# Patient Record
Sex: FEMALE | Race: WHITE | NOT HISPANIC OR LATINO | Employment: UNEMPLOYED | URBAN - METROPOLITAN AREA
[De-identification: names, ages, dates, MRNs, and addresses within clinical notes are randomized per-mention and may not be internally consistent; named-entity substitution may affect disease eponyms.]

---

## 2018-03-22 ENCOUNTER — OFFICE VISIT (OUTPATIENT)
Dept: URGENT CARE | Age: 33
End: 2018-03-22
Payer: COMMERCIAL

## 2018-03-22 VITALS
DIASTOLIC BLOOD PRESSURE: 60 MMHG | BODY MASS INDEX: 21.34 KG/M2 | TEMPERATURE: 98.5 F | HEIGHT: 64 IN | OXYGEN SATURATION: 98 % | WEIGHT: 125 LBS | HEART RATE: 120 BPM | RESPIRATION RATE: 18 BRPM | SYSTOLIC BLOOD PRESSURE: 100 MMHG

## 2018-03-22 DIAGNOSIS — J32.9 SINOBRONCHITIS: Primary | ICD-10-CM

## 2018-03-22 DIAGNOSIS — J40 SINOBRONCHITIS: Primary | ICD-10-CM

## 2018-03-22 PROCEDURE — S9083 URGENT CARE CENTER GLOBAL: HCPCS | Performed by: FAMILY MEDICINE

## 2018-03-22 PROCEDURE — G0382 LEV 3 HOSP TYPE B ED VISIT: HCPCS | Performed by: FAMILY MEDICINE

## 2018-03-22 RX ORDER — AZITHROMYCIN 250 MG/1
TABLET, FILM COATED ORAL
Qty: 6 TABLET | Refills: 0 | Status: SHIPPED | OUTPATIENT
Start: 2018-03-22 | End: 2018-03-26

## 2018-03-23 NOTE — PROGRESS NOTES
[facility]        NAME: Christi Villarreal is a 33 y o  female  : 1985    MRN: [id number]  DATE: 2018  TIME: 8:10 PM    Assessment and Plan   Sinobronchitis [J32 9, J40]  1  Sinobronchitis  azithromycin (ZITHROMAX) 250 mg tablet         Patient Instructions     Patient Instructions   Take antibiotic as directed  Recommend daily probiotic while on antibiotic  Push fluids  May benefit from over-the-counter decongestant expectorant such as Mucinex D for daytime symptom relief  May take 1/2 to 1 tablet twice a day with full glass of fluid  If not improving over the next 5-7 days, recommend following up with your primary care doctor  If you develop any chest pain or difficulty breathing, proceed to emergency room for immediate emergency medical evaluation  Chief Complaint     Chief Complaint   Patient presents with    Cold Like Symptoms     x 3 weeks -  worsening harsh, congested cough - worse at HS with foul taste, nasal congestion with PND, b/l ear pressure, sl  sore throat and fever x 3 days (max was 101 4 last night)  Took Tylenol and Motrin together  at 7 pm     Cough    Fever         History of Present Illness   María English presents to the clinic c/o    78-year-old female has been sick for about 3 weeks  Started with cold symptoms  Then her children have been sick and she has been taking care of them  She feels like her cough and postnasal drainage and sinus drainage have worsened over the last few days  She is now running fever with body aches and pains and sore throat and ear pressure  She is not taking anything other than Motrin and Tylenol  She feels a little wheezy  She states that she is a nonsmoker  Review of Systems   Review of Systems   Constitutional: Positive for activity change, appetite change, chills, fatigue and fever  HENT: Positive for congestion, ear pain, postnasal drip, rhinorrhea and sore throat   Negative for ear discharge, hearing loss, mouth sores, nosebleeds, sinus pain and sinus pressure  Eyes: Negative  Respiratory: Positive for cough and chest tightness  Negative for apnea, choking, shortness of breath, wheezing and stridor  Cardiovascular: Negative for chest pain, palpitations and leg swelling  Gastrointestinal: Negative  Musculoskeletal: Positive for arthralgias and myalgias  Negative for neck stiffness  Skin: Negative for rash  Neurological: Negative for dizziness, light-headedness and headaches  Hematological: Negative for adenopathy  Current Medications     Long-Term Prescriptions   Medication Sig Dispense Refill    norethindrone-ethinyl estradiol-iron (ESTROSTEP FE) 1-20/1-30/1-35 MG-MCG TABS Take 1 tablet by mouth daily         Current Allergies     Allergies as of 03/22/2018 - Reviewed 03/22/2018   Allergen Reaction Noted    Penicillins Hives 06/24/2014            The following portions of the patient's history were reviewed and updated as appropriate: allergies, current medications, past family history, past medical history, past social history, past surgical history and problem list     Objective   /60 (BP Location: Right arm, Patient Position: Sitting, Cuff Size: Standard)   Pulse (!) 120   Temp 98 5 °F (36 9 °C) (Oral)   Resp 18   Ht 5' 4" (1 626 m)   Wt 56 7 kg (125 lb)   LMP 03/09/2018 (Approximate)   SpO2 98%   BMI 21 46 kg/m²        Physical Exam     Physical Exam   Constitutional: She is oriented to person, place, and time  She appears well-developed and well-nourished  No distress  Appears acutely ill but in no acute distress   HENT:   Head: Normocephalic and atraumatic  Right Ear: External ear normal    Left Ear: External ear normal    Cobblestoning of posterior pharynx with patchy redness  No exudate or fetid breath  Nasal turbinates red and edematous    No purulent mucus   Eyes: Conjunctivae and EOM are normal  Pupils are equal, round, and reactive to light  Right eye exhibits no discharge  Left eye exhibits no discharge  Neck: Normal range of motion  Neck supple  Cardiovascular: Normal rate, regular rhythm and normal heart sounds  Exam reveals no gallop and no friction rub  No murmur heard  Pulmonary/Chest: Effort normal and breath sounds normal  No respiratory distress  She has no wheezes  She has no rales  Lymphadenopathy:     She has no cervical adenopathy  Neurological: She is alert and oriented to person, place, and time  Skin: Skin is warm and dry  No rash noted  She is not diaphoretic  Psychiatric: She has a normal mood and affect  Nursing note and vitals reviewed

## 2018-03-23 NOTE — PATIENT INSTRUCTIONS
Take antibiotic as directed  Recommend daily probiotic while on antibiotic  Push fluids  May benefit from over-the-counter decongestant expectorant such as Mucinex D for daytime symptom relief  May take 1/2 to 1 tablet twice a day with full glass of fluid  If not improving over the next 5-7 days, recommend following up with your primary care doctor  If you develop any chest pain or difficulty breathing, proceed to emergency room for immediate emergency medical evaluation

## 2018-05-24 ENCOUNTER — OFFICE VISIT (OUTPATIENT)
Dept: URGENT CARE | Age: 33
End: 2018-05-24
Payer: COMMERCIAL

## 2018-05-24 VITALS
HEIGHT: 64 IN | BODY MASS INDEX: 21.51 KG/M2 | SYSTOLIC BLOOD PRESSURE: 125 MMHG | RESPIRATION RATE: 16 BRPM | WEIGHT: 126 LBS | DIASTOLIC BLOOD PRESSURE: 85 MMHG | OXYGEN SATURATION: 99 % | TEMPERATURE: 98.7 F | HEART RATE: 101 BPM

## 2018-05-24 DIAGNOSIS — K08.89 PAIN, DENTAL: Primary | ICD-10-CM

## 2018-05-24 PROCEDURE — S9083 URGENT CARE CENTER GLOBAL: HCPCS | Performed by: FAMILY MEDICINE

## 2018-05-24 PROCEDURE — G0381 LEV 2 HOSP TYPE B ED VISIT: HCPCS | Performed by: FAMILY MEDICINE

## 2018-05-24 RX ORDER — CLINDAMYCIN HYDROCHLORIDE 300 MG/1
300 CAPSULE ORAL 4 TIMES DAILY
Qty: 40 CAPSULE | Refills: 0 | Status: SHIPPED | OUTPATIENT
Start: 2018-05-24 | End: 2018-06-03

## 2018-05-24 NOTE — PROGRESS NOTES
[facility]        NAME: Yinka Gonzales is a 33 y o  female  : 1985    MRN: [id number]  DATE: May 24, 2018  TIME: 12:09 PM    Assessment and Plan   Pain, dental [K08 89]  1  Pain, dental  clindamycin (CLEOCIN) 300 MG capsule         Patient Instructions     Patient Instructions   Clindamycin 4 times a day until finished (please take probiotics)  Swish mouth with warm salt water or mouthwash  Soft foods  Pain medication as needed  Recheck/follow-up with oral surgeon as planned  Please go to the hospital emergency department if needed  Proceed to  ER if symptoms worsen  Chief Complaint     Chief Complaint   Patient presents with    Dental Pain     left side , infection          History of Present Illness       Patient with dental pain for the past 2 days; patient states she has been in contact with the oral surgeon for further care        Review of Systems   Review of Systems   Constitutional: Negative for chills and fever     HENT:        Left-sided dental pain         Current Medications       Current Outpatient Prescriptions:     clindamycin (CLEOCIN) 300 MG capsule, Take 1 capsule (300 mg total) by mouth 4 (four) times a day for 40 doses, Disp: 40 capsule, Rfl: 0    Multiple Vitamins-Minerals (CENTRUM ADULTS PO), Take 1 tablet by mouth daily, Disp: , Rfl:     norethindrone-ethinyl estradiol-iron (ESTROSTEP FE) 1-20/1-30/1-35 MG-MCG TABS, Take 1 tablet by mouth daily, Disp: , Rfl:     Current Allergies     Allergies as of 2018 - Reviewed 2018   Allergen Reaction Noted    Penicillins Hives 2014            The following portions of the patient's history were reviewed and updated as appropriate: allergies, current medications, past family history, past medical history, past social history, past surgical history and problem list      Past Medical History:   Diagnosis Date    Clotting disorder ([address] )        Past Surgical History:   Procedure Laterality Date  CERVICAL BIOPSY  W/ LOOP ELECTRODE EXCISION      WISDOM TOOTH EXTRACTION         No family history on file  Medications have been verified  Objective   /85   Pulse 101   Temp 98 7 °F (37 1 °C)   Resp 16   Ht 5' 4" (1 626 m)   Wt 57 2 kg (126 lb)   LMP 05/01/2018   SpO2 99%   BMI 21 63 kg/m²        Physical Exam     Physical Exam   HENT:   Tenderness of left lower molar with drainage present   Nursing note and vitals reviewed

## 2018-05-24 NOTE — PATIENT INSTRUCTIONS
Clindamycin 4 times a day until finished (please take probiotics)  Swish mouth with warm salt water or mouthwash  Soft foods  Pain medication as needed  Recheck/follow-up with oral surgeon as planned  Please go to the hospital emergency department if needed

## 2018-06-11 ENCOUNTER — OFFICE VISIT (OUTPATIENT)
Dept: URGENT CARE | Age: 33
End: 2018-06-11
Payer: COMMERCIAL

## 2018-06-11 VITALS
BODY MASS INDEX: 20.83 KG/M2 | SYSTOLIC BLOOD PRESSURE: 140 MMHG | DIASTOLIC BLOOD PRESSURE: 96 MMHG | WEIGHT: 125 LBS | TEMPERATURE: 98.8 F | RESPIRATION RATE: 20 BRPM | HEIGHT: 65 IN | OXYGEN SATURATION: 98 % | HEART RATE: 123 BPM

## 2018-06-11 DIAGNOSIS — S81.812A LACERATION OF LEFT LOWER EXTREMITY, INITIAL ENCOUNTER: Primary | ICD-10-CM

## 2018-06-11 PROCEDURE — 90714 TD VACC NO PRESV 7 YRS+ IM: CPT

## 2018-06-11 PROCEDURE — 90471 IMMUNIZATION ADMIN: CPT | Performed by: PHYSICIAN ASSISTANT

## 2018-06-11 PROCEDURE — G0382 LEV 3 HOSP TYPE B ED VISIT: HCPCS | Performed by: FAMILY MEDICINE

## 2018-06-11 PROCEDURE — 12002 RPR S/N/AX/GEN/TRNK2.6-7.5CM: CPT | Performed by: FAMILY MEDICINE

## 2018-06-11 PROCEDURE — S9083 URGENT CARE CENTER GLOBAL: HCPCS | Performed by: FAMILY MEDICINE

## 2018-06-11 RX ORDER — LIDOCAINE HYDROCHLORIDE AND EPINEPHRINE 10; 10 MG/ML; UG/ML
1 INJECTION, SOLUTION INFILTRATION; PERINEURAL ONCE
Status: DISCONTINUED | OUTPATIENT
Start: 2018-06-11 | End: 2021-05-05 | Stop reason: HOSPADM

## 2018-06-11 NOTE — PATIENT INSTRUCTIONS
Keep wound clean dry and covered    Wash daily with soap and water  Recheck immediately  if you have any increasing redness, swelling, warmth, pain      Follow-up in 12-14 days for suture removal

## 2018-06-11 NOTE — PROGRESS NOTES
[facility]        NAME: Deirdre Avelar is a 33 y o  female  : 1985    MRN: [id number]  DATE: 2018  TIME: 5:34 PM    Assessment and Plan   Laceration of left lower extremity, initial encounter [S81 962A]  1  Laceration of left lower extremity, initial encounter  TD Vaccine greater than or equal to 6yo Preservative Free IM    lidocaine-epinephrine (XYLOCAINE/EPINEPHRINE) 1 %-1:100,000 injection 1 mL         Patient Instructions       Follow up with PCP in 3-5 days  Proceed to  ER if symptoms worsen  Chief Complaint     Chief Complaint   Patient presents with    Laceration      Was taking the garbage out in side was a broken glass and she got cut on her left leg it happened today   she is up today with tetanus shot  History of Present Illness       Patient here for evaluation laceration to her left lower leg  Patient had turned out the trash with a piece of broken glass in it and as she was walking by the class protruded and cut her leg  She denies any numbness, tingling  Review of Systems   Review of Systems   Constitutional: Negative  Skin: Positive for wound (Left lower leg)           Current Medications       Current Outpatient Prescriptions:     Multiple Vitamins-Minerals (CENTRUM ADULTS PO), Take 1 tablet by mouth daily, Disp: , Rfl:     norethindrone-ethinyl estradiol-iron (ESTROSTEP FE) 1-20/1-30/1-35 MG-MCG TABS, Take 1 tablet by mouth daily, Disp: , Rfl:     Current Facility-Administered Medications:     lidocaine-epinephrine (XYLOCAINE/EPINEPHRINE) 1 %-1:100,000 injection 1 mL, 1 mL, Infiltration, Once, Gustavo Mullins PA-C    Current Allergies     Allergies as of 2018 - Reviewed 2018   Allergen Reaction Noted    Penicillins Hives 2014    Beeswax Throat Swelling 2018    Penicillin v Hives 2016    Pertussis vaccine Hives     Pineapple Swelling 2016            The following portions of the patient's history were reviewed and updated as appropriate: allergies, current medications, past family history, past medical history, past social history, past surgical history and problem list      Past Medical History:   Diagnosis Date    Clotting disorder ([address] )        Past Surgical History:   Procedure Laterality Date    CERVICAL BIOPSY  W/ LOOP ELECTRODE EXCISION      WISDOM TOOTH EXTRACTION         History reviewed  No pertinent family history  Medications have been verified  Objective   /96   Pulse (!) 123   Temp 98 8 °F (37 1 °C) (Temporal)   Resp 20   Ht 5' 5" (1 651 m)   Wt 56 7 kg (125 lb)   LMP 06/11/2018   SpO2 98%   BMI 20 80 kg/m²        Physical Exam     Physical Exam   Constitutional: She is oriented to person, place, and time  She appears well-developed and well-nourished  Neurological: She is alert and oriented to person, place, and time  Skin: Skin is warm and dry  Laceration noted  Psychiatric: She has a normal mood and affect  Her behavior is normal    Nursing note and vitals reviewed  Laceration repair  Date/Time: 6/11/2018 5:37 PM  Performed by: Gladis Morgan  Authorized by: Gladis Morgan   Consent: Verbal consent obtained    Risks and benefits: risks, benefits and alternatives were discussed  Consent given by: patient  Patient understanding: patient states understanding of the procedure being performed  Patient consent: the patient's understanding of the procedure matches consent given  Patient identity confirmed: verbally with patient  Body area: lower extremity  Location details: left lower leg  Laceration length: 3 cm  Foreign bodies: no foreign bodies  Tendon involvement: none  Nerve involvement: none  Vascular damage: no  Anesthesia: local infiltration    Anesthesia:  Local Anesthetic: lidocaine 1% with epinephrine  Anesthetic total: 5 mL    Sedation:  Patient sedated: no      Procedure Details:  Irrigation solution: tap water  Irrigation method: tap  Amount of cleaning: standard  Debridement: none  Degree of undermining: none  Skin closure: 4-0 nylon  Number of sutures: 7  Suture technique: 6 simple and 1 vertical mattress    Approximation: close  Approximation difficulty: simple  Dressing: antibiotic ointment  Patient tolerance: Patient tolerated the procedure well with no immediate complications

## 2018-06-21 ENCOUNTER — OFFICE VISIT (OUTPATIENT)
Dept: URGENT CARE | Age: 33
End: 2018-06-21
Payer: COMMERCIAL

## 2018-06-21 VITALS
OXYGEN SATURATION: 96 % | RESPIRATION RATE: 12 BRPM | WEIGHT: 126 LBS | SYSTOLIC BLOOD PRESSURE: 101 MMHG | HEART RATE: 74 BPM | BODY MASS INDEX: 21.51 KG/M2 | HEIGHT: 64 IN | DIASTOLIC BLOOD PRESSURE: 72 MMHG | TEMPERATURE: 97.8 F

## 2018-06-21 DIAGNOSIS — L08.9 LOCAL INFECTION OF THE SKIN AND SUBCUTANEOUS TISSUE, UNSPECIFIED: ICD-10-CM

## 2018-06-21 DIAGNOSIS — Z48.02 ENCOUNTER FOR REMOVAL OF SUTURES: Primary | ICD-10-CM

## 2018-06-21 PROCEDURE — S9083 URGENT CARE CENTER GLOBAL: HCPCS | Performed by: FAMILY MEDICINE

## 2018-06-21 PROCEDURE — G0382 LEV 3 HOSP TYPE B ED VISIT: HCPCS | Performed by: FAMILY MEDICINE

## 2018-06-21 RX ORDER — CEPHALEXIN 500 MG/1
500 CAPSULE ORAL EVERY 6 HOURS SCHEDULED
Qty: 28 CAPSULE | Refills: 0 | Status: SHIPPED | OUTPATIENT
Start: 2018-06-21 | End: 2018-06-21 | Stop reason: SDUPTHER

## 2018-06-21 RX ORDER — CEPHALEXIN 500 MG/1
500 CAPSULE ORAL EVERY 6 HOURS SCHEDULED
Qty: 28 CAPSULE | Refills: 0 | Status: SHIPPED | OUTPATIENT
Start: 2018-06-21 | End: 2018-06-28

## 2019-02-22 ENCOUNTER — OFFICE VISIT (OUTPATIENT)
Dept: URGENT CARE | Age: 34
End: 2019-02-22
Payer: COMMERCIAL

## 2019-02-22 VITALS
DIASTOLIC BLOOD PRESSURE: 87 MMHG | RESPIRATION RATE: 18 BRPM | OXYGEN SATURATION: 99 % | TEMPERATURE: 98 F | SYSTOLIC BLOOD PRESSURE: 117 MMHG | WEIGHT: 125 LBS | HEIGHT: 65 IN | BODY MASS INDEX: 20.83 KG/M2 | HEART RATE: 100 BPM

## 2019-02-22 DIAGNOSIS — J01.90 ACUTE SINUSITIS, RECURRENCE NOT SPECIFIED, UNSPECIFIED LOCATION: Primary | ICD-10-CM

## 2019-02-22 PROCEDURE — G0382 LEV 3 HOSP TYPE B ED VISIT: HCPCS | Performed by: FAMILY MEDICINE

## 2019-02-22 PROCEDURE — S9083 URGENT CARE CENTER GLOBAL: HCPCS | Performed by: FAMILY MEDICINE

## 2019-02-22 RX ORDER — AZITHROMYCIN 250 MG/1
TABLET, FILM COATED ORAL
Qty: 6 TABLET | Refills: 0 | Status: SHIPPED | OUTPATIENT
Start: 2019-02-22 | End: 2019-02-26

## 2019-02-23 NOTE — PROGRESS NOTES
[facility]        NAME: Wayne Jimenez is a 29 y o  female  : 1985    MRN: [id number]  DATE: 2019  TIME: 7:28 PM    Assessment and Plan   Acute sinusitis, recurrence not specified, unspecified location [J01 90]  1  Acute sinusitis, recurrence not specified, unspecified location  azithromycin (ZITHROMAX) 250 mg tablet         Patient Instructions     Patient Instructions   Rest and drink extra fluids  Start antibiotic  Take probiotic  OTC cough and cold medication as needed  Nasal saline flushes and moises pot flushes can be helpful  Tylenol or motrin as needed for pain  Follow up with PCP if no improvement  Go to ER with worsening symptoms  Chief Complaint     Chief Complaint   Patient presents with    URI     pt states cough with head and chest congestion x2 weeks  History of Present Illness   Wayne Jimenez presents to the clinic c/o    This is a 29year old female here today with cough and congestion  She states she has had symptoms for 2 weeks  She states she is having green mucous  No fevers but is feeling poorly  She does have sinus pressure  She has been using Ibuprofen and Tylenol   Review of Systems   Review of Systems   Constitutional: Positive for activity change and fatigue  HENT: Positive for congestion, sinus pressure and sinus pain  Respiratory: Positive for cough  Cardiovascular: Negative  Skin: Negative  Neurological: Negative  Psychiatric/Behavioral: Negative            Current Medications     Long-Term Medications   Medication Sig Dispense Refill    norethindrone-ethinyl estradiol-iron (ESTROSTEP FE) 1-20/1-30/1-35 MG-MCG TABS Take 1 tablet by mouth daily         Current Allergies     Allergies as of 2019 - Reviewed 2019   Allergen Reaction Noted    Penicillins Hives 2014    Beeswax Throat Swelling 2018    Penicillin v Hives 2016    Pertussis vaccine Hives and Other (See Comments)     Pineapple Swelling 05/03/2016            The following portions of the patient's history were reviewed and updated as appropriate: allergies, current medications, past family history, past medical history, past social history, past surgical history and problem list     Objective   /87 (BP Location: Left arm, Patient Position: Sitting)   Pulse 100   Temp 98 °F (36 7 °C) (Temporal)   Resp 18   Ht 5' 5" (1 651 m)   Wt 56 7 kg (125 lb)   LMP 02/11/2019   SpO2 99%   BMI 20 80 kg/m²        Physical Exam     Physical Exam   Constitutional: She is oriented to person, place, and time  She appears well-developed and well-nourished  HENT:   Head: Normocephalic  Right Ear: External ear normal    Left Ear: External ear normal    TTP over sinus   Neck: Normal range of motion  Neck supple  Cardiovascular: Normal rate and regular rhythm  Pulmonary/Chest: Effort normal and breath sounds normal    Neurological: She is alert and oriented to person, place, and time  Skin: Skin is warm and dry  Psychiatric: She has a normal mood and affect  Her behavior is normal    Nursing note and vitals reviewed

## 2019-02-23 NOTE — PATIENT INSTRUCTIONS
Rest and drink extra fluids  Start antibiotic  Take probiotic  OTC cough and cold medication as needed  Nasal saline flushes and moises pot flushes can be helpful  Tylenol or motrin as needed for pain  Follow up with PCP if no improvement  Go to ER with worsening symptoms

## 2021-05-04 ENCOUNTER — APPOINTMENT (EMERGENCY)
Dept: ULTRASOUND IMAGING | Facility: HOSPITAL | Age: 36
End: 2021-05-04
Payer: COMMERCIAL

## 2021-05-04 ENCOUNTER — ANESTHESIA EVENT (EMERGENCY)
Dept: PERIOP | Facility: HOSPITAL | Age: 36
End: 2021-05-04
Payer: COMMERCIAL

## 2021-05-04 ENCOUNTER — HOSPITAL ENCOUNTER (OUTPATIENT)
Facility: HOSPITAL | Age: 36
Setting detail: OUTPATIENT SURGERY
Discharge: HOME | End: 2021-05-05
Attending: EMERGENCY MEDICINE | Admitting: OBSTETRICS & GYNECOLOGY
Payer: COMMERCIAL

## 2021-05-04 ENCOUNTER — ANESTHESIA (EMERGENCY)
Dept: PERIOP | Facility: HOSPITAL | Age: 36
End: 2021-05-04
Payer: COMMERCIAL

## 2021-05-04 DIAGNOSIS — O03.4 RETAINED PRODUCTS OF CONCEPTION FOLLOWING ABORTION: Primary | ICD-10-CM

## 2021-05-04 DIAGNOSIS — D50.0 ANEMIA, BLOOD LOSS: ICD-10-CM

## 2021-05-04 PROBLEM — Z98.890 S/P D&C (STATUS POST DILATION AND CURETTAGE): Status: ACTIVE | Noted: 2021-05-04

## 2021-05-04 LAB
ABO GROUP BLD: NORMAL
ALBUMIN SERPL BCP-MCNC: 2.9 G/DL (ref 3.5–5)
ALP SERPL-CCNC: 57 U/L (ref 46–116)
ALT SERPL W P-5'-P-CCNC: 38 U/L (ref 12–78)
AMPHETAMINES SERPL QL SCN: POSITIVE
ANION GAP SERPL CALCULATED.3IONS-SCNC: 9 MMOL/L (ref 4–13)
AST SERPL W P-5'-P-CCNC: 32 U/L (ref 5–45)
BARBITURATES UR QL: NEGATIVE
BASOPHILS # BLD AUTO: 0.01 THOUSANDS/ΜL (ref 0–0.1)
BASOPHILS NFR BLD AUTO: 0 % (ref 0–1)
BENZODIAZ UR QL: POSITIVE
BILIRUB SERPL-MCNC: 0.23 MG/DL (ref 0.2–1)
BLD GP AB SCN SERPL QL: NEGATIVE
BUN SERPL-MCNC: 17 MG/DL (ref 5–25)
CALCIUM ALBUM COR SERPL-MCNC: 8.6 MG/DL (ref 8.3–10.1)
CALCIUM SERPL-MCNC: 7.7 MG/DL (ref 8.3–10.1)
CHLORIDE SERPL-SCNC: 111 MMOL/L (ref 100–108)
CO2 SERPL-SCNC: 25 MMOL/L (ref 21–32)
COCAINE UR QL: POSITIVE
CREAT SERPL-MCNC: 0.76 MG/DL (ref 0.6–1.3)
EOSINOPHIL # BLD AUTO: 0.24 THOUSAND/ΜL (ref 0–0.61)
EOSINOPHIL NFR BLD AUTO: 5 % (ref 0–6)
ERYTHROCYTE [DISTWIDTH] IN BLOOD BY AUTOMATED COUNT: 13.1 % (ref 11.6–15.1)
GFR SERPL CREATININE-BSD FRML MDRD: 103 ML/MIN/1.73SQ M
GLUCOSE SERPL-MCNC: 86 MG/DL (ref 65–140)
HCG SERPL QL: POSITIVE
HCT VFR BLD AUTO: 27.4 % (ref 34.8–46.1)
HGB BLD-MCNC: 8.6 G/DL (ref 11.5–15.4)
IMM GRANULOCYTES # BLD AUTO: 0.01 THOUSAND/UL (ref 0–0.2)
IMM GRANULOCYTES NFR BLD AUTO: 0 % (ref 0–2)
INR PPP: 1.17 (ref 0.84–1.19)
LYMPHOCYTES # BLD AUTO: 1.34 THOUSANDS/ΜL (ref 0.6–4.47)
LYMPHOCYTES NFR BLD AUTO: 30 % (ref 14–44)
MCH RBC QN AUTO: 28.6 PG (ref 26.8–34.3)
MCHC RBC AUTO-ENTMCNC: 31.4 G/DL (ref 31.4–37.4)
MCV RBC AUTO: 91 FL (ref 82–98)
METHADONE UR QL: NEGATIVE
MONOCYTES # BLD AUTO: 0.27 THOUSAND/ΜL (ref 0.17–1.22)
MONOCYTES NFR BLD AUTO: 6 % (ref 4–12)
NEUTROPHILS # BLD AUTO: 2.66 THOUSANDS/ΜL (ref 1.85–7.62)
NEUTS SEG NFR BLD AUTO: 59 % (ref 43–75)
NRBC BLD AUTO-RTO: 0 /100 WBCS
OPIATES UR QL SCN: POSITIVE
OXYCODONE+OXYMORPHONE UR QL SCN: NEGATIVE
PCP UR QL: NEGATIVE
PLATELET # BLD AUTO: 130 THOUSANDS/UL (ref 149–390)
PMV BLD AUTO: 11.4 FL (ref 8.9–12.7)
POTASSIUM SERPL-SCNC: 3 MMOL/L (ref 3.5–5.3)
PROT SERPL-MCNC: 5.3 G/DL (ref 6.4–8.2)
PROTHROMBIN TIME: 15 SECONDS (ref 11.6–14.5)
RBC # BLD AUTO: 3.01 MILLION/UL (ref 3.81–5.12)
RH BLD: POSITIVE
SODIUM SERPL-SCNC: 145 MMOL/L (ref 136–145)
SPECIMEN EXPIRATION DATE: NORMAL
THC UR QL: NEGATIVE
WBC # BLD AUTO: 4.53 THOUSAND/UL (ref 4.31–10.16)

## 2021-05-04 PROCEDURE — 86850 RBC ANTIBODY SCREEN: CPT | Performed by: EMERGENCY MEDICINE

## 2021-05-04 PROCEDURE — 86901 BLOOD TYPING SEROLOGIC RH(D): CPT | Performed by: EMERGENCY MEDICINE

## 2021-05-04 PROCEDURE — 96365 THER/PROPH/DIAG IV INF INIT: CPT

## 2021-05-04 PROCEDURE — 85025 COMPLETE CBC W/AUTO DIFF WBC: CPT | Performed by: EMERGENCY MEDICINE

## 2021-05-04 PROCEDURE — 86920 COMPATIBILITY TEST SPIN: CPT

## 2021-05-04 PROCEDURE — 88305 TISSUE EXAM BY PATHOLOGIST: CPT | Performed by: PATHOLOGY

## 2021-05-04 PROCEDURE — 36415 COLL VENOUS BLD VENIPUNCTURE: CPT | Performed by: EMERGENCY MEDICINE

## 2021-05-04 PROCEDURE — 86900 BLOOD TYPING SEROLOGIC ABO: CPT | Performed by: EMERGENCY MEDICINE

## 2021-05-04 PROCEDURE — 85610 PROTHROMBIN TIME: CPT | Performed by: EMERGENCY MEDICINE

## 2021-05-04 PROCEDURE — NC001 PR NO CHARGE: Performed by: OBSTETRICS & GYNECOLOGY

## 2021-05-04 PROCEDURE — P9016 RBC LEUKOCYTES REDUCED: HCPCS

## 2021-05-04 PROCEDURE — 80053 COMPREHEN METABOLIC PANEL: CPT | Performed by: EMERGENCY MEDICINE

## 2021-05-04 PROCEDURE — 99291 CRITICAL CARE FIRST HOUR: CPT | Performed by: EMERGENCY MEDICINE

## 2021-05-04 PROCEDURE — 76856 US EXAM PELVIC COMPLETE: CPT

## 2021-05-04 PROCEDURE — P9017 PLASMA 1 DONOR FRZ W/IN 8 HR: HCPCS

## 2021-05-04 PROCEDURE — 85245 CLOT FACTOR VIII VW RISTOCTN: CPT | Performed by: EMERGENCY MEDICINE

## 2021-05-04 PROCEDURE — 80307 DRUG TEST PRSMV CHEM ANLYZR: CPT | Performed by: EMERGENCY MEDICINE

## 2021-05-04 PROCEDURE — 36430 TRANSFUSION BLD/BLD COMPNT: CPT

## 2021-05-04 PROCEDURE — 84703 CHORIONIC GONADOTROPIN ASSAY: CPT | Performed by: EMERGENCY MEDICINE

## 2021-05-04 PROCEDURE — 76830 TRANSVAGINAL US NON-OB: CPT

## 2021-05-04 PROCEDURE — 99285 EMERGENCY DEPT VISIT HI MDM: CPT

## 2021-05-04 RX ORDER — SUCCINYLCHOLINE/SOD CL,ISO/PF 100 MG/5ML
SYRINGE (ML) INTRAVENOUS AS NEEDED
Status: DISCONTINUED | OUTPATIENT
Start: 2021-05-04 | End: 2021-05-04

## 2021-05-04 RX ORDER — CALCIUM CHLORIDE 100 MG/ML
INJECTION INTRAVENOUS; INTRAVENTRICULAR AS NEEDED
Status: DISCONTINUED | OUTPATIENT
Start: 2021-05-04 | End: 2021-05-04

## 2021-05-04 RX ORDER — SODIUM CHLORIDE, SODIUM LACTATE, POTASSIUM CHLORIDE, CALCIUM CHLORIDE 600; 310; 30; 20 MG/100ML; MG/100ML; MG/100ML; MG/100ML
INJECTION, SOLUTION INTRAVENOUS CONTINUOUS PRN
Status: DISCONTINUED | OUTPATIENT
Start: 2021-05-04 | End: 2021-05-04

## 2021-05-04 RX ORDER — MEPERIDINE HYDROCHLORIDE 25 MG/ML
12.5 INJECTION INTRAMUSCULAR; INTRAVENOUS; SUBCUTANEOUS
Status: DISCONTINUED | OUTPATIENT
Start: 2021-05-04 | End: 2021-05-05 | Stop reason: HOSPADM

## 2021-05-04 RX ORDER — CLINDAMYCIN PHOSPHATE 600 MG/50ML
INJECTION INTRAVENOUS AS NEEDED
Status: DISCONTINUED | OUTPATIENT
Start: 2021-05-04 | End: 2021-05-04

## 2021-05-04 RX ORDER — LIDOCAINE HYDROCHLORIDE 10 MG/ML
INJECTION, SOLUTION EPIDURAL; INFILTRATION; INTRACAUDAL; PERINEURAL AS NEEDED
Status: DISCONTINUED | OUTPATIENT
Start: 2021-05-04 | End: 2021-05-04

## 2021-05-04 RX ORDER — ONDANSETRON 2 MG/ML
INJECTION INTRAMUSCULAR; INTRAVENOUS AS NEEDED
Status: DISCONTINUED | OUTPATIENT
Start: 2021-05-04 | End: 2021-05-04

## 2021-05-04 RX ORDER — MAGNESIUM HYDROXIDE 1200 MG/15ML
LIQUID ORAL AS NEEDED
Status: DISCONTINUED | OUTPATIENT
Start: 2021-05-04 | End: 2021-05-04 | Stop reason: HOSPADM

## 2021-05-04 RX ORDER — HYDROMORPHONE HCL/PF 1 MG/ML
0.4 SYRINGE (ML) INJECTION
Status: DISCONTINUED | OUTPATIENT
Start: 2021-05-04 | End: 2021-05-05 | Stop reason: HOSPADM

## 2021-05-04 RX ORDER — ONDANSETRON 2 MG/ML
4 INJECTION INTRAMUSCULAR; INTRAVENOUS ONCE AS NEEDED
Status: DISCONTINUED | OUTPATIENT
Start: 2021-05-04 | End: 2021-05-05 | Stop reason: HOSPADM

## 2021-05-04 RX ORDER — FENTANYL CITRATE 50 UG/ML
INJECTION, SOLUTION INTRAMUSCULAR; INTRAVENOUS AS NEEDED
Status: DISCONTINUED | OUTPATIENT
Start: 2021-05-04 | End: 2021-05-04

## 2021-05-04 RX ORDER — DEXAMETHASONE SODIUM PHOSPHATE 10 MG/ML
INJECTION, SOLUTION INTRAMUSCULAR; INTRAVENOUS AS NEEDED
Status: DISCONTINUED | OUTPATIENT
Start: 2021-05-04 | End: 2021-05-04

## 2021-05-04 RX ORDER — IBUPROFEN 600 MG/1
600 TABLET ORAL EVERY 6 HOURS SCHEDULED
Status: DISCONTINUED | OUTPATIENT
Start: 2021-05-05 | End: 2021-05-05

## 2021-05-04 RX ORDER — ACETAMINOPHEN 325 MG/1
650 TABLET ORAL EVERY 6 HOURS SCHEDULED
Status: DISCONTINUED | OUTPATIENT
Start: 2021-05-05 | End: 2021-05-05 | Stop reason: HOSPADM

## 2021-05-04 RX ORDER — MIDAZOLAM HYDROCHLORIDE 2 MG/2ML
INJECTION, SOLUTION INTRAMUSCULAR; INTRAVENOUS AS NEEDED
Status: DISCONTINUED | OUTPATIENT
Start: 2021-05-04 | End: 2021-05-04

## 2021-05-04 RX ORDER — PROPOFOL 10 MG/ML
INJECTION, EMULSION INTRAVENOUS AS NEEDED
Status: DISCONTINUED | OUTPATIENT
Start: 2021-05-04 | End: 2021-05-04

## 2021-05-04 RX ORDER — ALBUTEROL SULFATE 2.5 MG/3ML
2.5 SOLUTION RESPIRATORY (INHALATION) ONCE AS NEEDED
Status: DISCONTINUED | OUTPATIENT
Start: 2021-05-04 | End: 2021-05-05 | Stop reason: HOSPADM

## 2021-05-04 RX ORDER — FENTANYL CITRATE/PF 50 MCG/ML
25 SYRINGE (ML) INJECTION
Status: DISCONTINUED | OUTPATIENT
Start: 2021-05-04 | End: 2021-05-05 | Stop reason: HOSPADM

## 2021-05-04 RX ORDER — ROCURONIUM BROMIDE 10 MG/ML
INJECTION, SOLUTION INTRAVENOUS AS NEEDED
Status: DISCONTINUED | OUTPATIENT
Start: 2021-05-04 | End: 2021-05-04

## 2021-05-04 RX ADMIN — FENTANYL CITRATE 25 MCG: 50 INJECTION, SOLUTION INTRAMUSCULAR; INTRAVENOUS at 23:00

## 2021-05-04 RX ADMIN — Medication 50 MG: at 22:59

## 2021-05-04 RX ADMIN — DEXAMETHASONE SODIUM PHOSPHATE 4 MG: 10 INJECTION, SOLUTION INTRAMUSCULAR; INTRAVENOUS at 23:00

## 2021-05-04 RX ADMIN — PHENYLEPHRINE HYDROCHLORIDE 200 MCG: 10 INJECTION INTRAVENOUS at 23:07

## 2021-05-04 RX ADMIN — CALCIUM CHLORIDE 0.25 G: 100 INJECTION PARENTERAL at 23:12

## 2021-05-04 RX ADMIN — LIDOCAINE HYDROCHLORIDE 50 MG: 10 INJECTION, SOLUTION EPIDURAL; INFILTRATION; INTRACAUDAL at 22:59

## 2021-05-04 RX ADMIN — CLINDAMYCIN PHOSPHATE 600 MG: 12 INJECTION, SOLUTION INTRAMUSCULAR; INTRAVENOUS at 23:08

## 2021-05-04 RX ADMIN — MIDAZOLAM HYDROCHLORIDE 2 MG: 1 INJECTION, SOLUTION INTRAMUSCULAR; INTRAVENOUS at 22:50

## 2021-05-04 RX ADMIN — SODIUM CHLORIDE, SODIUM LACTATE, POTASSIUM CHLORIDE, AND CALCIUM CHLORIDE: .6; .31; .03; .02 INJECTION, SOLUTION INTRAVENOUS at 22:56

## 2021-05-04 RX ADMIN — SODIUM CHLORIDE, SODIUM LACTATE, POTASSIUM CHLORIDE, AND CALCIUM CHLORIDE: .6; .31; .03; .02 INJECTION, SOLUTION INTRAVENOUS at 22:34

## 2021-05-04 RX ADMIN — CALCIUM CHLORIDE 0.25 G: 100 INJECTION PARENTERAL at 23:09

## 2021-05-04 RX ADMIN — TRANEXAMIC ACID 1000 MG: 1 INJECTION, SOLUTION INTRAVENOUS at 21:40

## 2021-05-04 RX ADMIN — FENTANYL CITRATE 25 MCG: 50 INJECTION, SOLUTION INTRAMUSCULAR; INTRAVENOUS at 22:56

## 2021-05-04 RX ADMIN — ROCURONIUM BROMIDE 40 MG: 10 SOLUTION INTRAVENOUS at 23:01

## 2021-05-04 RX ADMIN — DESMOPRESSIN ACETATE 16.8 MCG: 4 SOLUTION INTRAVENOUS at 23:13

## 2021-05-04 RX ADMIN — ONDANSETRON 4 MG: 2 INJECTION INTRAMUSCULAR; INTRAVENOUS at 23:00

## 2021-05-04 RX ADMIN — CALCIUM CHLORIDE 0.25 G: 100 INJECTION PARENTERAL at 23:01

## 2021-05-04 RX ADMIN — CALCIUM CHLORIDE 0.25 G: 100 INJECTION PARENTERAL at 23:04

## 2021-05-04 RX ADMIN — PROPOFOL 200 MG: 10 INJECTION, EMULSION INTRAVENOUS at 22:59

## 2021-05-05 VITALS
HEART RATE: 93 BPM | OXYGEN SATURATION: 98 % | DIASTOLIC BLOOD PRESSURE: 54 MMHG | SYSTOLIC BLOOD PRESSURE: 98 MMHG | RESPIRATION RATE: 18 BRPM | TEMPERATURE: 99 F

## 2021-05-05 LAB
ABO GROUP BLD BPU: NORMAL
BASOPHILS # BLD AUTO: 0 THOUSANDS/ΜL (ref 0–0.1)
BASOPHILS NFR BLD AUTO: 0 % (ref 0–1)
BPU ID: NORMAL
CROSSMATCH: NORMAL
CROSSMATCH: NORMAL
EOSINOPHIL # BLD AUTO: 0.01 THOUSAND/ΜL (ref 0–0.61)
EOSINOPHIL NFR BLD AUTO: 0 % (ref 0–6)
ERYTHROCYTE [DISTWIDTH] IN BLOOD BY AUTOMATED COUNT: 13.2 % (ref 11.6–15.1)
HCT VFR BLD AUTO: 28.9 % (ref 34.8–46.1)
HGB BLD-MCNC: 9.3 G/DL (ref 11.5–15.4)
IMM GRANULOCYTES # BLD AUTO: 0.02 THOUSAND/UL (ref 0–0.2)
IMM GRANULOCYTES NFR BLD AUTO: 0 % (ref 0–2)
LYMPHOCYTES # BLD AUTO: 0.44 THOUSANDS/ΜL (ref 0.6–4.47)
LYMPHOCYTES NFR BLD AUTO: 8 % (ref 14–44)
MCH RBC QN AUTO: 28.8 PG (ref 26.8–34.3)
MCHC RBC AUTO-ENTMCNC: 32.2 G/DL (ref 31.4–37.4)
MCV RBC AUTO: 90 FL (ref 82–98)
MONOCYTES # BLD AUTO: 0.06 THOUSAND/ΜL (ref 0.17–1.22)
MONOCYTES NFR BLD AUTO: 1 % (ref 4–12)
NEUTROPHILS # BLD AUTO: 4.68 THOUSANDS/ΜL (ref 1.85–7.62)
NEUTS SEG NFR BLD AUTO: 91 % (ref 43–75)
NRBC BLD AUTO-RTO: 0 /100 WBCS
PLATELET # BLD AUTO: 117 THOUSANDS/UL (ref 149–390)
PMV BLD AUTO: 11 FL (ref 8.9–12.7)
RBC # BLD AUTO: 3.23 MILLION/UL (ref 3.81–5.12)
UNIT DISPENSE STATUS: NORMAL
UNIT PRODUCT CODE: NORMAL
UNIT RH: NORMAL
WBC # BLD AUTO: 5.21 THOUSAND/UL (ref 4.31–10.16)

## 2021-05-05 PROCEDURE — 85025 COMPLETE CBC W/AUTO DIFF WBC: CPT | Performed by: OBSTETRICS & GYNECOLOGY

## 2021-05-05 RX ORDER — SODIUM CHLORIDE 9 MG/ML
125 INJECTION, SOLUTION INTRAVENOUS CONTINUOUS
Status: DISCONTINUED | OUTPATIENT
Start: 2021-05-05 | End: 2021-05-05 | Stop reason: HOSPADM

## 2021-05-05 RX ORDER — IBUPROFEN 600 MG/1
600 TABLET ORAL EVERY 6 HOURS SCHEDULED
Status: DISCONTINUED | OUTPATIENT
Start: 2021-05-05 | End: 2021-05-05

## 2021-05-05 RX ORDER — ONDANSETRON 2 MG/ML
4 INJECTION INTRAMUSCULAR; INTRAVENOUS EVERY 6 HOURS PRN
Status: DISCONTINUED | OUTPATIENT
Start: 2021-05-05 | End: 2021-05-05 | Stop reason: HOSPADM

## 2021-05-05 RX ORDER — OXYCODONE HYDROCHLORIDE 5 MG/1
5 TABLET ORAL EVERY 4 HOURS PRN
Status: DISCONTINUED | OUTPATIENT
Start: 2021-05-05 | End: 2021-05-05 | Stop reason: HOSPADM

## 2021-05-05 RX ADMIN — ACETAMINOPHEN 650 MG: 325 TABLET, FILM COATED ORAL at 05:17

## 2021-05-05 RX ADMIN — SODIUM CHLORIDE 125 ML/HR: 0.9 INJECTION, SOLUTION INTRAVENOUS at 03:51

## 2021-05-05 NOTE — ANESTHESIA PREPROCEDURE EVALUATION
Procedure:  DILATATION AND CURETTAGE (D&C) (N/A Uterus)    Relevant Problems   No relevant active problems      S/p   Heavy vaginal bleeding  vWD    Recent labs personally reviewed:  Lab Results   Component Value Date    WBC 4 53 2021    HGB 8 6 (L) 2021     (L) 2021     Lab Results   Component Value Date    K 3 0 (L) 2021    BUN 17 2021    CREATININE 0 76 2021     No results found for: PTT   Lab Results   Component Value Date    INR 1 17 2021     No results found for: HGBA1C    Physical Exam    Airway    Mallampati score: II  TM Distance: >3 FB  Neck ROM: full     Dental       Cardiovascular  Rhythm: regular, Rate: normal,     Pulmonary  Breath sounds clear to auscultation,     Other Findings        Anesthesia Plan  ASA Score- 3 Emergent    Anesthesia Type- general with ASA Monitors  Additional Monitors:   Airway Plan: ETT  Plan Factors-Exercise tolerance (METS): >4 METS  Chart reviewed  Existing labs reviewed  Patient summary reviewed  Induction- intravenous and rapid sequence induction  Postoperative Plan-     Informed Consent- Anesthetic plan and risks discussed with patient  I personally reviewed this patient with the CRNA  Discussed and agreed on the Anesthesia Plan with the CRNA  Eva Barkley

## 2021-05-05 NOTE — DISCHARGE SUMMARY
Discharge Summary - Claudene Keener 30 y o  female MRN: [id number]    Unit/Bed#: S -01 Encounter: [id number]    Admission Date: 2021    Admitting Diagnosis: Retained products of conception following  [O03 4]  Vaginal bleeding [N93 9]    HPI: pt presented to ED with increasing heavy bleeding 3 weeks following elective AB  In ED with heavy bleeding, us showed retained POC  Procedures Performed:   Orders Placed This Encounter   Procedures    Critical Care       Summary of Hospital Course: pt seen in ED, given desmopressin in light of von Willebrand's disease, and TXA  Taken to OR where suction D an E performed and us showed no further retained POC  Pt given 2 u pRBC this admission and discharge hgb 9 3  Pt with UDS+ amphetamines, benzos, opiates, cocaine  Declines intervention for substance abuse at this time  Will continue her bcp estrostep     Significant Findings, Care, Treatment and Services Provided: D and E, transfusion 2 u pRBC, TXA, desmopressin    Complications: none    Discharge Diagnosis: s/p D and E for retained POC with hemorrhage, anemia due to blood loss, poly substance abuse    Resolved Problems  Date Reviewed: 2021    None          Condition at Discharge: good         Discharge instructions/Information to patient and family:   See after visit summary for information provided to patient and family  Provisions for Follow-Up Care:  See after visit summary for information related to follow-up care and any pertinent home health orders  PCP: Hari Barahona DO    Disposition: See After Visit Summary for discharge disposition information  Planned Readmission: No      Discharge Statement   I spent 30 minutes discharging the patient  This time was spent on the day of discharge  I had direct contact with the patient on the day of discharge  Additional documentation is required if more than 30 minutes were spent on discharge       Discharge Medications:  See after visit summary for reconciled discharge medications provided to patient and family

## 2021-05-05 NOTE — OP NOTE
OPERATIVE REPORT  PATIENT NAME: Marc Thomas    :  1985  MRN: [id number]  Pt Location: AN OR ROOM 01    SURGERY DATE: 2021    Surgeon(s) and Role:     * Julia Pierce MD - Primary     * Ja Kaiser MD - Assisting    Preop Diagnosis:  Retained products of conception following  [O03 4]    Post-Op Diagnosis Codes:     * Retained products of conception following  [O03 4]    Procedure(s) (LRB):  DILATATION AND CURETTAGE (D&C) FOR RETAINED POC (N/A)    Specimen(s):  ID Type Source Tests Collected by Time Destination   1 : retained products of conception Tissue Products of Conception TISSUE EXAM Julia Pierce MD 2021 [id number]        Estimated Blood Loss: 200cc    Anesthesia Type: Choice    Operative Indications: Retained products of conception following  [O03 4]    Operative Findings:  - Retained products of conception   - Dilated cervical os     Brief History    Marc Thomas is a 29 yo [id number] who presented to the ED for heavy vaginal bleeding  The patient states that she had a termination at approximately 11 weeks gestation about 3 weeks ago  She said that she has had moderate bleeding, but then started to have much heavier bleeding starting today  The patient is a poor historian, likely due to substance abuse  The patient denies drug use, but appears intoxicated  Upon evaluation in the ED, the patient had several large, orange-sized clots in the vagina and cervix  Dr Posey Lies and I entered the room when the patient was undergoing TVUS  US  was suggestive of retained products of conception  Ultimately, it was decided to proceed with surgical management given the patient's history of von Willebrand disease  patient received TXA and desmopressin in anticipation of the OR  All risks, benefits, and alternatives to the procedure were discussed with the patient and she had the opportunity to ask questions  Informed consent was obtained       Description of Procedure    Patient was taken to the operating room where a time out was performed to confirm correct patient and correct procedure  General LMA anesthesia (LMA) was administered and the patient was positioned on the OR table in the dorsal lithotomy position  All pressure points were padded and a андрей hugger was placed to maintain control of core body temperature  A bimanual exam was performed and the uterus was noted to be anteverted, and enlarged  The patient was prepped and draped in the usual sterile fashion  Operative Technique    A straight catheter was introduced into the bladder, which was drained of 325cc of clear yellow urine  A weighted speculum was inserted into the vagina and a [place] retractor was used to visualize the anterior lip of the cervix, which was then grasped with a single toothed tenaculum  The cervix was already dilated to 2-3 cm and therefore did not require further dilation  The 10 curved suction curette was introduced into the uterine cavity to the fundus  Suction was applied and curetting was performed by rotating curette 360 degrees as it was withdrawn from the uterus  Suction was released prior to reaching the cervical os  This was done until all products of conception were evacuated  Following suction curettage, sharp curetting was gently performed starting at the 12'oclock position and rotating a total of 360 degrees to cover all surfaces  Products of conception/endometrial tissue was obtained and sent for pathology  The single toothed tenaculum was removed from the anterior lip of the cervix  Good hemostasis was confirmed at the tenaculum puncture sites  Weighted speculum was then removed from the vagina  At the conclusion of the procedure, all needle, sponge, and instrument counts were noted to be correct x2  Patient tolerated the procedure well and was transferred to PACU  She will be admitted for monitoring overnight due to history of vWD       Dr Saira Encinas was present and participated in all key portions of the case      Patient Disposition:  PACU     SIGNATURE: Candy Tinsley MD  DATE: May 5, 2021  TIME: 12:30 AM

## 2021-05-05 NOTE — DISCHARGE INSTRUCTIONS
Dilation and Curettage   WHAT YOU SHOULD KNOW:   Dilation and curettage (D and C) is a procedure to remove tissue from the lining of your uterus  AFTER YOU LEAVE:   Medicines:   · Pain medicine: You may be given medicine to take away or decrease pain  Do not wait until the pain is severe before you take your medicine  · Antibiotics: This medicine will help fight or prevent an infection  · Take your medicine as directed  Call your healthcare provider if you think your medicine is not helping or if you have side effects  Tell him if you are allergic to any medicine  Keep a list of the medicines, vitamins, and herbs you take  Include the amounts, and when and why you take them  Bring the list or the pill bottles to follow-up visits  Carry your medicine list with you in case of an emergency  Follow up with your healthcare provider as directed:  Write down your questions so you remember to ask them during your visits  Activity:  Ask your primary healthcare provider when you can return to your normal activities  Contact your primary healthcare provider if:   · You have foul-smelling vaginal discharge  · You do not get your monthly period  · You feel depressed or anxious  · You feel very tired and weak  · You have questions or concerns about your condition or care  Seek care immediately or call 911 if:   · You have heavy vaginal bleeding that soaks 1 pad in 1 hour for 2 hours in a row  · You have a fever and abdominal cramps  · Your pain does not get better, even after treatment  © 2014 [address] [employer] is for End User's use only and may not be sold, redistributed or otherwise used for commercial purposes  All illustrations and images included in CareNotes® are the copyrighted property of [employer]  or Paulino Ram  The above information is an  only  It is not intended as medical advice for individual conditions or treatments   Talk to your doctor, nurse or pharmacist before following any medical regimen to see if it is safe and effective for you

## 2021-05-05 NOTE — ANESTHESIA POSTPROCEDURE EVALUATION
Post-Op Assessment Note    CV Status:  Stable  Pain Score: 0    Pain management: adequate     Mental Status:  Sleepy   Hydration Status:  Euvolemic   PONV Controlled:  Controlled   Airway Patency:  Patent      Post Op Vitals Reviewed: Yes      Staff: CRNA   Comments: vss sv nonobstructed uneventful        No complications documented      BP 94/50 (05/04/21 2345)    Temp (!) 96 7 °F (35 9 °C) (05/04/21 2345)    Pulse 91 (05/04/21 2345)   Resp 18 (05/04/21 2345)    SpO2 99 % (05/04/21 2345)

## 2021-05-05 NOTE — PROGRESS NOTES
OBGYN Progress Note   Agnes Russell 30 y o  female MRN: [id number]  Unit/Bed#: S -01 Encounter: [id number]      Assessment: 30 y  o  w/ heavy vaginal bleeding s/p TAB now POD#1 s/p D&C for retained products of conception  Plan:    #1  Retained POCs:  - S/p D&C  - Minimal vaginal bleeding this AM  - Hgb 8 6 -> 9 3, QBL 700cc, s/p 2U pRBCs, s/p TXA    #2  Polysubstance use:  - UDS +amphetamines, benzos, opiates, cocaine    #3  Von Willebrand disease:  - Desmopressin if continued bleeding    #4  Contraception:  - Would like OCPs      Subjective:    Agnes Russell has no current complaints  Reports minimal vaginal bleeding and pain  She is tolerating PO diet, denies nausea/vomitng  She is voiding  She is ambulating  Patient denies fever, chills, chest pain, shortness of breath, or calf tenderness  Objective:  BP 96/58 (BP Location: Right arm)   Pulse 65   Temp 98 9 °F (37 2 °C) (Oral)   Resp 18   SpO2 99%     Physical Exam  Constitutional:       General: She is not in acute distress  Appearance: She is not ill-appearing  Cardiovascular:      Rate and Rhythm: Normal rate  Pulmonary:      Effort: Pulmonary effort is normal  No respiratory distress  Abdominal:      General: Abdomen is flat  There is no distension  Palpations: Abdomen is soft  Tenderness: There is no abdominal tenderness  Skin:     General: Skin is warm and dry  Neurological:      Mental Status: She is alert and oriented to person, place, and time  Psychiatric:         Mood and Affect: Mood normal          Behavior: Behavior normal          Thought Content: Thought content normal            No intake/output data recorded    I/O this shift:  In: 3100 [I V :2150; Blood:950]  Out: -     Lab Results   Component Value Date    WBC 5 21 05/05/2021    HGB 9 3 (L) 05/05/2021    HCT 28 9 (L) 05/05/2021    MCV 90 05/05/2021     (L) 05/05/2021       Lab Results   Component Value Date    CALCIUM 7 7 (L) 05/04/2021    K 3 0 (L) 05/04/2021    CO2 25 05/04/2021     (H) 05/04/2021    BUN 17 05/04/2021    CREATININE 0 76 05/04/2021           Rony Carpenter MD  OB/GYN PGY-3  5/5/2021  6:35 AM

## 2021-05-05 NOTE — ED PROVIDER NOTES
History  Chief Complaint   Patient presents with    Vaginal Bleeding     patient presents today by EMS from home due to very heavy vaginal bleeding  onset ~45 min ago  Pt had full  approx 2 weeks ago  hx clotting disorder      HPI     64-year-old female with history of von Willebrand's disease not currently being treated, presenting for evaluation of heavy vaginal bleeding  Patient states that she had a elective  3 weeks ago at approximately 11 weeks gestation     She developed some light vaginal bleeding 2 days ago, which became very heavy few hours ago  Reports passing numerous large clots  Reports lower abdominal cramping  No fevers or chills  Reports lightheadedness  Prior to Admission Medications   Prescriptions Last Dose Informant Patient Reported? Taking? Multiple Vitamins-Minerals (CENTRUM ADULTS PO)   Yes No   Sig: Take 1 tablet by mouth daily   norethindrone-ethinyl estradiol-iron (ESTROSTEP FE) 1-20/1-30/1-35 MG-MCG TABS   Yes No   Sig: Take 1 tablet by mouth daily      Facility-Administered Medications Last Administration Doses Remaining   lidocaine-epinephrine (XYLOCAINE/EPINEPHRINE) 1 %-1:100,000 injection 1 mL None recorded 1          Past Medical History:   Diagnosis Date    Clotting disorder ([facility] )     Von Willebrand disease ([facility] )        Past Surgical History:   Procedure Laterality Date    CERVICAL BIOPSY  W/ LOOP ELECTRODE EXCISION      INDUCED       WISDOM TOOTH EXTRACTION         History reviewed  No pertinent family history  I have reviewed and agree with the history as documented  E-Cigarette/Vaping     E-Cigarette/Vaping Substances     Social History     Tobacco Use    Smoking status: Current Some Day Smoker     Packs/day: 0 50    Smokeless tobacco: Never Used   Substance Use Topics    Alcohol use: No     Comment: social    Drug use: No       Review of Systems   Constitutional: Negative for chills and fever  HENT: Negative for congestion  Eyes: Negative for visual disturbance  Respiratory: Negative for cough and shortness of breath  Cardiovascular: Negative for chest pain and leg swelling  Gastrointestinal: Positive for abdominal pain (Abdominal cramping)  Negative for diarrhea, nausea and vomiting  Genitourinary: Positive for vaginal bleeding  Negative for dysuria and frequency  Musculoskeletal: Negative for arthralgias, back pain, neck pain and neck stiffness  Skin: Negative for rash  Neurological: Negative for weakness, numbness and headaches  Psychiatric/Behavioral: Negative for agitation, behavioral problems and confusion  Physical Exam  Physical Exam  Constitutional:       General: She is not in acute distress  Appearance: She is well-developed  She is not diaphoretic  HENT:      Head: Normocephalic and atraumatic  Right Ear: External ear normal       Left Ear: External ear normal       Nose: Nose normal    Eyes:      Conjunctiva/sclera: Conjunctivae normal    Neck:      Musculoskeletal: Normal range of motion and neck supple  Cardiovascular:      Rate and Rhythm: Normal rate and regular rhythm  Heart sounds: Normal heart sounds  No murmur  No friction rub  No gallop  Pulmonary:      Effort: Pulmonary effort is normal  No respiratory distress  Breath sounds: Normal breath sounds  No wheezing or rales  Abdominal:      General: Bowel sounds are normal  There is no distension  Palpations: Abdomen is soft  Tenderness: There is no abdominal tenderness  There is no guarding  Comments: Abdomen benign   Genitourinary:     Comments: Steady trickle of bright red blood from the vaginal vault  3 large tangerine-sized blood clots removed from the vaginal vault, with approximately 50 cc of blood suctioned from the vaginal canal   No visible lacerations to the vagina or cervix  Musculoskeletal: Normal range of motion  General: No deformity  Skin:     General: Skin is warm and dry  Neurological:      Mental Status: She is alert and oriented to person, place, and time  Motor: No abnormal muscle tone  Psychiatric:      Comments: Strange affect with hyperactivity and pressured speech         Vital Signs  ED Triage Vitals [05/04/21 2049]   Temperature Pulse Respirations Blood Pressure SpO2   98 6 °F (37 °C) (!) 119 18 133/88 100 %      Temp Source Heart Rate Source Patient Position - Orthostatic VS BP Location FiO2 (%)   Oral Monitor Lying Right arm --      Pain Score       No Pain           Vitals:    05/05/21 0000 05/05/21 0015 05/05/21 0045 05/05/21 0100   BP: 90/54 117/74 131/86 117/69   Pulse: 88 84 72 64   Patient Position - Orthostatic VS:    Lying         Visual Acuity      ED Medications  Medications   ondansetron (ZOFRAN) injection 4 mg (has no administration in time range)   nicotine (NICODERM CQ) 7 mg/24hr TD 24 hr patch 1 patch (has no administration in time range)   acetaminophen (TYLENOL) tablet 650 mg (has no administration in time range)   tranexamic Acid 1,000 mg in sodium chloride 0 9 % 100 mL IVPB Loading Dose (1,000 mg Intravenous New Bag 5/4/21 2140)   desmopressin (DDAVP) 16 8 mcg in sodium chloride 0 9 % 50 mL IVPB (16 8 mcg Intravenous New Bag 5/4/21 2313)       Diagnostic Studies  Results Reviewed     Procedure Component Value Units Date/Time    Rapid drug screen, urine [[id number]]  (Abnormal) Collected: 05/04/21 2311    Lab Status: Final result Specimen: Urine, Catheter Updated: 05/04/21 2350     Amph/Meth UR Positive     Barbiturate Ur Negative     Benzodiazepine Urine Positive     Cocaine Urine Positive     Methadone Urine Negative     Opiate Urine Positive     PCP Ur Negative     THC Urine Negative     Oxycodone Urine Negative    Narrative:      Presumptive report  If requested, specimen will be sent to reference lab for confirmation  FOR MEDICAL PURPOSES ONLY  IF CONFIRMATION NEEDED PLEASE CONTACT THE LAB WITHIN 5 DAYS      Drug Screen Cutoff Levels:  AMPHETAMINE/METHAMPHETAMINES  1000 ng/mL  BARBITURATES     200 ng/mL  BENZODIAZEPINES     200 ng/mL  COCAINE      300 ng/mL  METHADONE      300 ng/mL  OPIATES      300 ng/mL  PHENCYCLIDINE     25 ng/mL  THC       50 ng/mL  OXYCODONE      100 ng/mL    hCG, qualitative pregnancy [[id number]]  (Abnormal) Collected: 05/04/21 2135    Lab Status: Final result Specimen: Blood from Arm, Left Updated: 05/04/21 2326     Preg, Serum Positive    Comprehensive metabolic panel [[id number]]  (Abnormal) Collected: 05/04/21 2135    Lab Status: Final result Specimen: Blood from Arm, Left Updated: 05/04/21 2205     Sodium 145 mmol/L      Potassium 3 0 mmol/L      Chloride 111 mmol/L      CO2 25 mmol/L      ANION GAP 9 mmol/L      BUN 17 mg/dL      Creatinine 0 76 mg/dL      Glucose 86 mg/dL      Calcium 7 7 mg/dL      Corrected Calcium 8 6 mg/dL      AST 32 U/L      ALT 38 U/L      Alkaline Phosphatase 57 U/L      Total Protein 5 3 g/dL      Albumin 2 9 g/dL      Total Bilirubin 0 23 mg/dL      eGFR 103 ml/min/1 73sq m     Narrative:      [facility] guidelines for Chronic Kidney Disease (CKD):     Stage 1 with normal or high GFR (GFR > 90 mL/min/1 73 square meters)    Stage 2 Mild CKD (GFR = 60-89 mL/min/1 73 square meters)    Stage 3A Moderate CKD (GFR = 45-59 mL/min/1 73 square meters)    Stage 3B Moderate CKD (GFR = 30-44 mL/min/1 73 square meters)    Stage 4 Severe CKD (GFR = 15-29 mL/min/1 73 square meters)    Stage 5 End Stage CKD (GFR <15 mL/min/1 73 square meters)  Note: GFR calculation is accurate only with a steady state creatinine    Protime-INR [[id number]]  (Abnormal) Collected: 05/04/21 2135    Lab Status: Final result Specimen: Blood from Arm, Left Updated: 05/04/21 2201     Protime 15 0 seconds      INR 1 17    CBC and differential [[id number]]  (Abnormal) Collected: 05/04/21 2135    Lab Status: Final result Specimen: Blood from Arm, Left Updated: 05/04/21 2200     WBC 4 53 Thousand/uL      RBC 3 01 Million/uL      Hemoglobin 8 6 g/dL      Hematocrit 27 4 %      MCV 91 fL      MCH 28 6 pg      MCHC 31 4 g/dL      RDW 13 1 %      MPV 11 4 fL      Platelets 081 Thousands/uL      nRBC 0 /100 WBCs      Neutrophils Relative 59 %      Immat GRANS % 0 %      Lymphocytes Relative 30 %      Monocytes Relative 6 %      Eosinophils Relative 5 %      Basophils Relative 0 %      Neutrophils Absolute 2 66 Thousands/µL      Immature Grans Absolute 0 01 Thousand/uL      Lymphocytes Absolute 1 34 Thousands/µL      Monocytes Absolute 0 27 Thousand/µL      Eosinophils Absolute 0 24 Thousand/µL      Basophils Absolute 0 01 Thousands/µL     VWF Activity [[id number]] Collected: 05/04/21 2135    Lab Status: In process Specimen: Blood from Arm, Left Updated: 05/04/21 2143    POCT pregnancy, urine [[id number]]     Lab Status: No result                  US pelvis complete w transvaginal   Final Result by Cassius Butler MD (05/04 2239)       Thickened heterogeneous vascular endometrial canal/stripe measuring approximately 20 mm  Heterogeneous material within the cervix representing blood products, I cannot exclude retained products of conception  Workstation performed: [id number]                    Procedures  CriticalCare Time  Performed by: Abril Sandoval MD  Authorized by: Abril Sandoval MD     Critical care provider statement:     Critical care time (minutes):  35    Critical care time was exclusive of:  Separately billable procedures and treating other patients and teaching time    Critical care was necessary to treat or prevent imminent or life-threatening deterioration of the following conditions: heavy vaginal bleeding requiring blood transfusion, IV TXA, and emergent transfer to the OR with Ob Gyn       Critical care was time spent personally by me on the following activities:  Obtaining history from patient or surrogate, development of treatment plan with patient or surrogate, discussions with consultants, evaluation of patient's response to treatment, examination of patient, review of old charts, re-evaluation of patient's condition, ordering and review of radiographic studies, ordering and review of laboratory studies and ordering and performing treatments and interventions    I assumed direction of critical care for this patient from another provider in my specialty: no               ED Course                                           MDM  Number of Diagnoses or Management Options  Anemia, blood loss: new and requires workup  Retained products of conception following : new and requires workup  Diagnosis management comments: OB emergently consulted due to heavy vaginal bleeding with large clots within the vaginal vault  Given history of Von Willebrand's disease, treatment with TXA initiated  Type and screen sent  1 unit of crossmatched blood ordered  Hemoglobin 8 6  OB at bedside and will be bringing the patient to the OR for concern for retained products  Pt is hemodynamically stable at the time of transfer to the OR  Of note, patient noted to have a strange affect with hyperactivity and pressured speech  Rapid drug screen ordered         Amount and/or Complexity of Data Reviewed  Clinical lab tests: ordered and reviewed  Tests in the radiology section of CPT®: ordered and reviewed  Discuss the patient with other providers: yes    Patient Progress  Patient progress: stable           Disposition  Final diagnoses:   Retained products of conception following    Anemia, blood loss     Time reflects when diagnosis was documented in both MDM as applicable and the Disposition within this note     Time User Action Codes Description Comment    2021 10:11 PM Cervantes Breath Add [O03 4] Retained products of conception following      2021 11:32 PM [place] Spikes Modify [O03 4] Retained products of conception following      2021 12:11 AM Kin Soriano Modify [O03 4] Retained products of conception following      2021  1:36 AM Raysa Aguayo Modify [O03 4] Retained products of conception following      2021  1:36 AM Raysa Aguayo Add [D50 0] Anemia, blood loss       ED Disposition     ED Disposition Condition Date/Time Comment    Send to OR  Wed May 5, 2021  1:36 AM       Follow-up Information    None         Current Discharge Medication List      CONTINUE these medications which have NOT CHANGED    Details   Multiple Vitamins-Minerals (CENTRUM ADULTS PO) Take 1 tablet by mouth daily      norethindrone-ethinyl estradiol-iron (ESTROSTEP FE) 1-20/1-30/1-35 MG-MCG TABS Take 1 tablet by mouth daily           No discharge procedures on file      PDMP Review     None          ED Provider  Electronically Signed by           Pilar Austin MD  21 [phone] Louann Dale MD  21 [id number]

## 2021-05-05 NOTE — H&P
H&P Exam - Gynecology   Orly Bowman 30 y o  female MRN: [id number]  Unit/Bed#: ED 25 Encounter: [id number]      History of Present Illness     HPI:  Orly Bowman is a 30 y o  [id number] who is approximately 3 weeks post termination who presents with heavy vaginal bleeding  Patient has a history of von Willebrand disease  Review of Systems   Constitutional: Negative  HENT: Negative  Eyes: Negative  Respiratory: Negative  Cardiovascular: Negative  Gastrointestinal: Negative  Endocrine: Negative  Genitourinary: Positive for pelvic pain and vaginal bleeding  Allergic/Immunologic: Negative  Neurological: Positive for dizziness and light-headedness  Hematological: Negative  Psychiatric/Behavioral: Positive for agitation and behavioral problems  The patient is nervous/anxious and is hyperactive  Historical Information   Past Medical History:   Diagnosis Date    [facility] )     Von Willebrand disease ([facility] )      Past Surgical History:   Procedure Laterality Date    CERVICAL BIOPSY  W/ LOOP ELECTRODE EXCISION      INDUCED       WISDOM TOOTH EXTRACTION       OB/GYN History: [id number]  History reviewed  No pertinent family history    Social History   Social History     Substance and Sexual Activity   Alcohol Use No    Comment: social     Social History     Substance and Sexual Activity   Drug Use No     Social History     Tobacco Use   Smoking Status Current Some Day Smoker    Packs/day: 0 50   Smokeless Tobacco Never Used       Meds/Allergies   (Not in a hospital admission)    Allergies   Allergen Reactions    Penicillins Hives    Beeswax Throat Swelling    Penicillin V Hives    Pertussis Vaccine Hives and Other (See Comments)    Pineapple - Food Allergy Swelling       Objective   /88 (BP Location: Right arm)   Pulse (!) 119   Temp 98 6 °F (37 °C) (Oral)   Resp 18   SpO2 100%     No intake or output data in the 24 hours ending 21 2216    Physical Exam  Genitourinary:     Comments: Multiple large clots in the vagina and cervix  Cervix is 2cm dilated  Wall suction used to evacuate blood from the vagina and cervical os         Lab Results:   Admission on 05/04/2021   Component Date Value    WBC 05/04/2021 4 53     RBC 05/04/2021 3 01*    Hemoglobin 05/04/2021 8 6*    Hematocrit 05/04/2021 27 4*    MCV 05/04/2021 91     MCH 05/04/2021 28 6     MCHC 05/04/2021 31 4     RDW 05/04/2021 13 1     MPV 05/04/2021 11 4     Platelets 51/98/8413 130*    nRBC 05/04/2021 0     Neutrophils Relative 05/04/2021 59     Immat GRANS % 05/04/2021 0     Lymphocytes Relative 05/04/2021 30     Monocytes Relative 05/04/2021 6     Eosinophils Relative 05/04/2021 5     Basophils Relative 05/04/2021 0     Neutrophils Absolute 05/04/2021 2 66     Immature Grans Absolute 05/04/2021 0 01     Lymphocytes Absolute 05/04/2021 1 34     Monocytes Absolute 05/04/2021 0 27     Eosinophils Absolute 05/04/2021 0 24     Basophils Absolute 05/04/2021 0 01     Sodium 05/04/2021 145     Potassium 05/04/2021 3 0*    Chloride 05/04/2021 111*    CO2 05/04/2021 25     ANION GAP 05/04/2021 9     BUN 05/04/2021 17     Creatinine 05/04/2021 0 76     Glucose 05/04/2021 86     Calcium 05/04/2021 7 7*    Corrected Calcium 05/04/2021 8 6     AST 05/04/2021 32     ALT 05/04/2021 38     Alkaline Phosphatase 05/04/2021 57     Total Protein 05/04/2021 5 3*    Albumin 05/04/2021 2 9*    Total Bilirubin 05/04/2021 0 23     eGFR 05/04/2021 103     Protime 05/04/2021 15 0*    INR 05/04/2021 1 17       Us Pelvis Complete W Transvaginal    Result Date: 5/4/2021  Impression: Thickened heterogeneous vascular endometrial canal/stripe measuring approximately 20 mm  Heterogeneous material within the cervix representing blood products, I cannot exclude retained products of conception    Workstation performed: [id number]       Assessment/Plan     A/P: Agnes Russell is a 30 y o  [id number] who is approximately 3 weeks post termination who presents with heavy vaginal bleeding      1) To OR for D&C of retained products of conception   - T&C for 2 units   - TXA pre-operative   - Will do D&C under US guidance     Code Status: Full Code    Lata Mejia MD  5/4/2021  10:16 PM

## 2021-05-07 LAB — VWF:RCO ACT/NOR PPP PL AGG: 58 % (ref 50–200)

## 2023-01-01 NOTE — PROGRESS NOTES
[facility]        NAME: Ralph Clark is a 33 y o  female  : 1985    MRN: [id number]  DATE: 2018  TIME: 1:43 PM    Assessment and Plan   Encounter for removal of sutures [Z48 02]  1  Encounter for removal of sutures  cephalexin (KEFLEX) 500 mg capsule   2  Local infection of the skin and subcutaneous tissue, unspecified  cephalexin (KEFLEX) 500 mg capsule         Patient Instructions       Follow up with PCP in 3-5 days  Proceed to  ER if symptoms worsen  Chief Complaint     Chief Complaint   Patient presents with    Suture / Staple Removal     LEFT LEG  S/P 2 WEEKS         History of Present Illness       Patient here for follow-up on the left lower leg laceration  Patient had sutures placed here on 2018  Patient states that over the last tear to the started getting more red and irritated  She denies any purulent drainage  Review of Systems   Review of Systems   Constitutional: Negative  Skin: Positive for wound           Current Medications       Current Outpatient Prescriptions:     Multiple Vitamins-Minerals (CENTRUM ADULTS PO), Take 1 tablet by mouth daily, Disp: , Rfl:     norethindrone-ethinyl estradiol-iron (ESTROSTEP FE) 1-20/1-30/1-35 MG-MCG TABS, Take 1 tablet by mouth daily, Disp: , Rfl:     cephalexin (KEFLEX) 500 mg capsule, Take 1 capsule (500 mg total) by mouth every 6 (six) hours for 7 days, Disp: 28 capsule, Rfl: 0    Current Facility-Administered Medications:     lidocaine-epinephrine (XYLOCAINE/EPINEPHRINE) 1 %-1:100,000 injection 1 mL, 1 mL, Infiltration, Once, Gustavo Mullins PA-C    Current Allergies     Allergies as of 2018 - Reviewed 2018   Allergen Reaction Noted    Penicillins Hives 2014    Beeswax Throat Swelling 2018    Penicillin v Hives 2016    Pertussis vaccine Hives and Other (See Comments)     Pineapple Swelling 2016            The following portions of the patient's history were reviewed and updated as appropriate: allergies, current medications, past family history, past medical history, past social history, past surgical history and problem list      Past Medical History:   Diagnosis Date    Clotting disorder ([address] )        Past Surgical History:   Procedure Laterality Date    CERVICAL BIOPSY  W/ LOOP ELECTRODE EXCISION      WISDOM TOOTH EXTRACTION         History reviewed  No pertinent family history  Medications have been verified  Objective   /72   Pulse 74   Temp 97 8 °F (36 6 °C) (Temporal)   Resp 12   Ht 5' 4" (1 626 m)   Wt 57 2 kg (126 lb)   LMP 06/11/2018   SpO2 96%   BMI 21 63 kg/m²        Physical Exam     Physical Exam   Constitutional: She is oriented to person, place, and time  She appears well-developed and well-nourished  No distress  Neurological: She is alert and oriented to person, place, and time  Skin: Skin is warm and dry  Laceration is healing well with no soft tissue swelling  No purulent drainage  There is some deep redness around a few of the suture holes  All sutures removed today with Steri-Strips placed  Psychiatric: She has a normal mood and affect  Her behavior is normal    Nursing note and vitals reviewed  2023

## 2023-08-19 ENCOUNTER — HOSPITAL ENCOUNTER (EMERGENCY)
Facility: HOSPITAL | Age: 38
Discharge: HOME | End: 2023-08-19
Attending: EMERGENCY MEDICINE | Admitting: EMERGENCY MEDICINE

## 2023-08-19 VITALS
RESPIRATION RATE: 18 BRPM | OXYGEN SATURATION: 97 % | TEMPERATURE: 98 F | WEIGHT: 150 LBS | BODY MASS INDEX: 24.96 KG/M2 | HEART RATE: 95 BPM | SYSTOLIC BLOOD PRESSURE: 126 MMHG | DIASTOLIC BLOOD PRESSURE: 87 MMHG

## 2023-08-19 DIAGNOSIS — R53.83 FATIGUE: ICD-10-CM

## 2023-08-19 DIAGNOSIS — S05.12XA ECCHYMOSIS OF LEFT EYE, INITIAL ENCOUNTER: ICD-10-CM

## 2023-08-19 DIAGNOSIS — R63.1 EXCESSIVE THIRST: Primary | ICD-10-CM

## 2023-08-19 PROCEDURE — 99283 EMERGENCY DEPT VISIT LOW MDM: CPT

## 2023-08-19 PROCEDURE — 99284 EMERGENCY DEPT VISIT MOD MDM: CPT | Performed by: EMERGENCY MEDICINE
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
## 2023-08-19 NOTE — ED NOTES
Pt refusing all blood work and IV fluids. Pt stated " I feel much better".  Notified MD Richard Pruitt, RN  08/19/23 [id number]

## 2024-02-21 PROBLEM — S81.812A LACERATION OF LEFT LOWER EXTREMITY: Status: RESOLVED | Noted: 2018-06-11 | Resolved: 2024-02-21

## 2024-08-02 NOTE — PROGRESS NOTES
"Assessment/Plan:     No problem-specific Assessment & Plan notes found for this encounter.          Diagnoses and all orders for this visit:    Positive pregnancy test  -     Ambulatory Referral to Maternal Fetal Medicine; Future  -     HIV 1/2 AG/AB w Reflex [facility] for 2 yr old and above; Future  -     Hepatitis C antibody; Future  -     UA (URINE) with reflex to Scope; Future  -     Urine culture; Future  -     Hepatitis B surface antigen; Future  -     CBC and differential; Future  -     Rubella antibody, IgG; Future  -     Type and screen; Future  -     RPR-Syphilis Screening (Total Syphilis IGG/IGM); Future  -     Hepatitis B surface antibody; Future  -     Anemia Panel w/Reflex, OB; Future    Positive blood pregnancy test    Request for sterilization    Other orders  -     buprenorphine (SUBUTEX) 8 mg; Place under the tongue daily  -     Prenatal Vit-Fe Fumarate-FA (PRENATAL PO); Take by mouth      Official dating per [facility].        Subjective:      Patient ID: María Prasad is a 38 y.o. female who presents from [facility] with positive pregnancy test.  She believes her LMP was in March.  She feels fetal movement.  She offers no complaints today.   TA US reveals a single viable IUP    FL 19 wk 1 day  Fundal height 20 cm    HPI    The following portions of the patient's history were reviewed and updated as appropriate: allergies, current medications, past family history, past medical history, past social history, past surgical history and problem list.    Review of Systems      Objective:      /80 (BP Location: Right arm, Patient Position: Sitting, Cuff Size: Adult)   Pulse (!) 112   Resp 18   Ht 5' 6\" (1.676 m)   Wt 69.9 kg (154 lb)   LMP 03/15/2024 (Approximate)   BMI 24.86 kg/m²          Physical Exam  Vitals and nursing note reviewed.   Constitutional:       Appearance: Normal appearance.   Pulmonary:      Effort: Pulmonary effort is normal.   Abdominal:      Palpations: Abdomen is soft.      " Tenderness: There is no abdominal tenderness.   Neurological:      General: No focal deficit present.      Mental Status: She is oriented to person, place, and time.   Psychiatric:         Mood and Affect: Mood normal.

## 2024-08-05 ENCOUNTER — TELEPHONE (OUTPATIENT)
Dept: OBGYN CLINIC | Facility: CLINIC | Age: 39
End: 2024-08-05

## 2024-08-05 ENCOUNTER — APPOINTMENT (OUTPATIENT)
Dept: LAB | Facility: CLINIC | Age: 39
End: 2024-08-05

## 2024-08-05 ENCOUNTER — OFFICE VISIT (OUTPATIENT)
Dept: OBGYN CLINIC | Facility: CLINIC | Age: 39
End: 2024-08-05

## 2024-08-05 VITALS
DIASTOLIC BLOOD PRESSURE: 80 MMHG | BODY MASS INDEX: 24.75 KG/M2 | HEART RATE: 112 BPM | SYSTOLIC BLOOD PRESSURE: 124 MMHG | WEIGHT: 154 LBS | RESPIRATION RATE: 18 BRPM | HEIGHT: 66 IN

## 2024-08-05 DIAGNOSIS — Z32.01 POSITIVE PREGNANCY TEST: Primary | ICD-10-CM

## 2024-08-05 DIAGNOSIS — Z32.01 POSITIVE PREGNANCY TEST: ICD-10-CM

## 2024-08-05 DIAGNOSIS — Z30.2 REQUEST FOR STERILIZATION: ICD-10-CM

## 2024-08-05 DIAGNOSIS — Z32.01 POSITIVE BLOOD PREGNANCY TEST: ICD-10-CM

## 2024-08-05 PROBLEM — Z98.890 S/P D&C (STATUS POST DILATION AND CURETTAGE): Status: RESOLVED | Noted: 2021-05-04 | Resolved: 2024-08-05

## 2024-08-05 PROBLEM — L08.9 LOCAL INFECTION OF THE SKIN AND SUBCUTANEOUS TISSUE, UNSPECIFIED: Status: RESOLVED | Noted: 2018-06-21 | Resolved: 2024-08-05

## 2024-08-05 PROBLEM — D68.00 VON WILLEBRAND DISEASE (HCC): Status: ACTIVE | Noted: 2024-08-05

## 2024-08-05 PROBLEM — Z48.02 ENCOUNTER FOR REMOVAL OF SUTURES: Status: RESOLVED | Noted: 2018-06-21 | Resolved: 2024-08-05

## 2024-08-05 LAB
ABO GROUP BLD: NORMAL
AMORPH URATE CRY URNS QL MICRO: ABNORMAL
BACTERIA UR QL AUTO: ABNORMAL /HPF
BASOPHILS # BLD AUTO: 0.02 THOUSANDS/ÂΜL (ref 0–0.1)
BASOPHILS NFR BLD AUTO: 0 % (ref 0–1)
BILIRUB UR QL STRIP: NEGATIVE
BLD GP AB SCN SERPL QL: NEGATIVE
CLARITY UR: ABNORMAL
COLOR UR: ABNORMAL
EOSINOPHIL # BLD AUTO: 0.12 THOUSAND/ÂΜL (ref 0–0.61)
EOSINOPHIL NFR BLD AUTO: 2 % (ref 0–6)
ERYTHROCYTE [DISTWIDTH] IN BLOOD BY AUTOMATED COUNT: 12.8 % (ref 11.6–15.1)
GLUCOSE UR STRIP-MCNC: NEGATIVE MG/DL
HCT VFR BLD AUTO: 34.2 % (ref 34.8–46.1)
HGB BLD-MCNC: 11.1 G/DL (ref 11.5–15.4)
HGB UR QL STRIP.AUTO: NEGATIVE
IMM GRANULOCYTES # BLD AUTO: 0.04 THOUSAND/UL (ref 0–0.2)
IMM GRANULOCYTES NFR BLD AUTO: 1 % (ref 0–2)
KETONES UR STRIP-MCNC: NEGATIVE MG/DL
LEUKOCYTE ESTERASE UR QL STRIP: ABNORMAL
LYMPHOCYTES # BLD AUTO: 1.2 THOUSANDS/ÂΜL (ref 0.6–4.47)
LYMPHOCYTES NFR BLD AUTO: 15 % (ref 14–44)
MCH RBC QN AUTO: 30.6 PG (ref 26.8–34.3)
MCHC RBC AUTO-ENTMCNC: 32.5 G/DL (ref 31.4–37.4)
MCV RBC AUTO: 94 FL (ref 82–98)
MONOCYTES # BLD AUTO: 0.46 THOUSAND/ÂΜL (ref 0.17–1.22)
MONOCYTES NFR BLD AUTO: 6 % (ref 4–12)
NEUTROPHILS # BLD AUTO: 6.21 THOUSANDS/ÂΜL (ref 1.85–7.62)
NEUTS SEG NFR BLD AUTO: 76 % (ref 43–75)
NITRITE UR QL STRIP: NEGATIVE
NON-SQ EPI CELLS URNS QL MICRO: ABNORMAL /HPF
NRBC BLD AUTO-RTO: 0 /100 WBCS
PH UR STRIP.AUTO: 7 [PH]
PLATELET # BLD AUTO: 140 THOUSANDS/UL (ref 149–390)
PMV BLD AUTO: 12.3 FL (ref 8.9–12.7)
PROT UR STRIP-MCNC: NEGATIVE MG/DL
RBC # BLD AUTO: 3.63 MILLION/UL (ref 3.81–5.12)
RBC #/AREA URNS AUTO: ABNORMAL /HPF
RH BLD: POSITIVE
RUBV IGG SERPL IA-ACNC: 16.8 IU/ML
SP GR UR STRIP.AUTO: 1.01 (ref 1–1.03)
SPECIMEN EXPIRATION DATE: NORMAL
UROBILINOGEN UR STRIP-ACNC: <2 MG/DL
WBC # BLD AUTO: 8.05 THOUSAND/UL (ref 4.31–10.16)
WBC #/AREA URNS AUTO: ABNORMAL /HPF

## 2024-08-05 PROCEDURE — 87086 URINE CULTURE/COLONY COUNT: CPT

## 2024-08-05 PROCEDURE — 81001 URINALYSIS AUTO W/SCOPE: CPT

## 2024-08-05 PROCEDURE — 87389 HIV-1 AG W/HIV-1&-2 AB AG IA: CPT

## 2024-08-05 PROCEDURE — 87340 HEPATITIS B SURFACE AG IA: CPT

## 2024-08-05 PROCEDURE — 86706 HEP B SURFACE ANTIBODY: CPT

## 2024-08-05 PROCEDURE — 86901 BLOOD TYPING SEROLOGIC RH(D): CPT

## 2024-08-05 PROCEDURE — 36415 COLL VENOUS BLD VENIPUNCTURE: CPT

## 2024-08-05 PROCEDURE — 86762 RUBELLA ANTIBODY: CPT

## 2024-08-05 PROCEDURE — 86850 RBC ANTIBODY SCREEN: CPT

## 2024-08-05 PROCEDURE — 86900 BLOOD TYPING SEROLOGIC ABO: CPT

## 2024-08-05 PROCEDURE — 99202 OFFICE O/P NEW SF 15 MIN: CPT | Performed by: OBSTETRICS & GYNECOLOGY

## 2024-08-05 PROCEDURE — 86803 HEPATITIS C AB TEST: CPT

## 2024-08-05 PROCEDURE — 86780 TREPONEMA PALLIDUM: CPT

## 2024-08-05 PROCEDURE — 85025 COMPLETE CBC W/AUTO DIFF WBC: CPT

## 2024-08-05 RX ORDER — BUPRENORPHINE 8 MG/1
TABLET SUBLINGUAL DAILY
COMMUNITY

## 2024-08-05 NOTE — TELEPHONE ENCOUNTER
Attempted to call, left a message asking for NCP to call the office, office number provided in message. When NCP calls back, please schedule a nurse intake appointment.

## 2024-08-06 LAB
HBV SURFACE AB SER-ACNC: <3 MIU/ML
HBV SURFACE AG SER QL: NORMAL
HCV AB SER QL: NORMAL
HIV 1+2 AB+HIV1 P24 AG SERPL QL IA: NORMAL
HIV 2 AB SERPL QL IA: NORMAL
HIV1 AB SERPL QL IA: NORMAL
HIV1 P24 AG SERPL QL IA: NORMAL
TREPONEMA PALLIDUM IGG+IGM AB [PRESENCE] IN SERUM OR PLASMA BY IMMUNOASSAY: NORMAL

## 2024-08-07 LAB — BACTERIA UR CULT: NORMAL

## 2024-08-20 ENCOUNTER — ROUTINE PRENATAL (OUTPATIENT)
Facility: HOSPITAL | Age: 39
End: 2024-08-20
Attending: OBSTETRICS & GYNECOLOGY
Payer: OTHER GOVERNMENT

## 2024-08-20 VITALS
DIASTOLIC BLOOD PRESSURE: 64 MMHG | HEIGHT: 65 IN | BODY MASS INDEX: 26.59 KG/M2 | WEIGHT: 159.6 LBS | SYSTOLIC BLOOD PRESSURE: 108 MMHG | HEART RATE: 92 BPM

## 2024-08-20 DIAGNOSIS — Z32.01 POSITIVE PREGNANCY TEST: ICD-10-CM

## 2024-08-20 DIAGNOSIS — D68.00 VON WILLEBRAND DISEASE (HCC): ICD-10-CM

## 2024-08-20 DIAGNOSIS — D68.9 COAGULATION DEFECT COMPLICATING PREGNANCY, THIRD TRIMESTER (HCC): ICD-10-CM

## 2024-08-20 DIAGNOSIS — O99.320 PREGNANCY COMPLICATED BY SUBUTEX MAINTENANCE, ANTEPARTUM (HCC): ICD-10-CM

## 2024-08-20 DIAGNOSIS — O09.893 SHORT INTERVAL BETWEEN PREGNANCIES COMPLICATING PREGNANCY, ANTEPARTUM, THIRD TRIMESTER: ICD-10-CM

## 2024-08-20 DIAGNOSIS — F11.20 PREGNANCY COMPLICATED BY SUBUTEX MAINTENANCE, ANTEPARTUM (HCC): ICD-10-CM

## 2024-08-20 DIAGNOSIS — O09.523 ELDERLY MULTIGRAVIDA, THIRD TRIMESTER: Primary | ICD-10-CM

## 2024-08-20 DIAGNOSIS — Z3A.29 29 WEEKS GESTATION OF PREGNANCY: ICD-10-CM

## 2024-08-20 DIAGNOSIS — O99.113 COAGULATION DEFECT COMPLICATING PREGNANCY, THIRD TRIMESTER (HCC): ICD-10-CM

## 2024-08-20 PROCEDURE — 99204 OFFICE O/P NEW MOD 45 MIN: CPT | Performed by: OBSTETRICS & GYNECOLOGY

## 2024-08-20 PROCEDURE — 76811 OB US DETAILED SNGL FETUS: CPT | Performed by: OBSTETRICS & GYNECOLOGY

## 2024-08-20 RX ORDER — DOCUSATE SODIUM 100 MG/1
100 CAPSULE, LIQUID FILLED ORAL 2 TIMES DAILY
COMMUNITY

## 2024-08-20 RX ORDER — FERROUS SULFATE 325(65) MG
325 TABLET ORAL
COMMUNITY

## 2024-08-20 RX ORDER — ACETAMINOPHEN 325 MG/1
650 TABLET ORAL EVERY 6 HOURS PRN
COMMUNITY

## 2024-08-20 NOTE — LETTER
August 25, 2024     [facility] PROVIDER    Patient: María Prasad   YOB: 1985   Date of Visit: 8/20/2024       Dear  Provider:    Thank you for referring María Prasad to me for evaluation. Below are my notes for this consultation.    If you have questions, please do not hesitate to call me. I look forward to following your patient along with you.         Sincerely,        Chao Singh MD        CC: No Recipients    Chao Singh MD  8/25/2024 11:19 AM  Sign when Signing Visit  Please refer to the [facility] ultrasound report in Ob Procedures for additional information regarding today's visit

## 2024-08-28 ENCOUNTER — INITIAL PRENATAL (OUTPATIENT)
Dept: OBGYN CLINIC | Facility: CLINIC | Age: 39
End: 2024-08-28

## 2024-08-28 ENCOUNTER — ROUTINE PRENATAL (OUTPATIENT)
Dept: OBGYN CLINIC | Facility: CLINIC | Age: 39
End: 2024-08-28

## 2024-08-28 VITALS
HEIGHT: 65 IN | BODY MASS INDEX: 26.23 KG/M2 | SYSTOLIC BLOOD PRESSURE: 125 MMHG | WEIGHT: 157.4 LBS | HEART RATE: 105 BPM | DIASTOLIC BLOOD PRESSURE: 77 MMHG

## 2024-08-28 VITALS
HEIGHT: 65 IN | HEART RATE: 105 BPM | DIASTOLIC BLOOD PRESSURE: 77 MMHG | BODY MASS INDEX: 26.23 KG/M2 | WEIGHT: 157.4 LBS | SYSTOLIC BLOOD PRESSURE: 125 MMHG

## 2024-08-28 DIAGNOSIS — Z3A.30 30 WEEKS GESTATION OF PREGNANCY: Primary | ICD-10-CM

## 2024-08-28 DIAGNOSIS — O09.33 LATE PRENATAL CARE IN THIRD TRIMESTER: Primary | ICD-10-CM

## 2024-08-28 DIAGNOSIS — O09.30 LATE PRENATAL CARE: ICD-10-CM

## 2024-08-28 DIAGNOSIS — D68.00 VON WILLEBRAND DISEASE (HCC): ICD-10-CM

## 2024-08-28 DIAGNOSIS — Z11.3 SCREENING FOR STD (SEXUALLY TRANSMITTED DISEASE): ICD-10-CM

## 2024-08-28 PROCEDURE — 99211 OFF/OP EST MAY X REQ PHY/QHP: CPT

## 2024-08-28 PROCEDURE — 87591 N.GONORRHOEAE DNA AMP PROB: CPT | Performed by: OBSTETRICS & GYNECOLOGY

## 2024-08-28 PROCEDURE — 99213 OFFICE O/P EST LOW 20 MIN: CPT | Performed by: OBSTETRICS & GYNECOLOGY

## 2024-08-28 PROCEDURE — 87491 CHLMYD TRACH DNA AMP PROBE: CPT | Performed by: OBSTETRICS & GYNECOLOGY

## 2024-08-28 NOTE — LETTER
Work Letter    08/28/24      María Prasad  1985  [address] [address]    Dear María Prasad,      Your employee is a patient at [facility] .    We recommend that all pregnant women:    1. Have a well-ventilated workspace.  2. Wear low-heeled shoes.  3. Work no more than 40 hours per week.  4. Have a 15 minute break every 2 hours and at least 30 minutes for a meal break.   5. Use good body mechanics by bending at your knees to avoid back strain and lift no more than 20 pounds without assistance. Will need assistance with lifting over 20 lbs.   6. Have ready access to bathrooms and water.      She may continue to work until her due date unless medical complications arise. We anticipate she may return to work in 6-8 weeks after delivery.     Sincerely,  [facility]  [address], [address]  [place]  [address]  OFFICE:  [phone]    OR  [address] [place]  [address]  OFFICE:  [phone]

## 2024-08-28 NOTE — LETTER
Proof of Pregnancy Letter      08/28/24            María Prasad  1985  [address] [place] [id number]      María Prasad is a patient at our facility. María Prasad Estimated Date of Delivery: 11/1/24       Any questions or concerns, please feel free to contact our office.      Sincerely,      [facility]  [address] [place] [address], [address]  [place] [address]  Office:  [phone]

## 2024-08-28 NOTE — LETTER
[facility] Letter    08/28/24        María HOUSTON Prasad  1985  [address] [address]       María Prasad is a patient and under our care in our office. María Prasad's Estimated Date of Delivery: 11/1/24.  Any questions or concerns feel free to contact our office.         Respectfully,    [facility] Care Team      [facility]/[place]  [phone]  [facility]/[place]  [phone]    [facility]/[place]  [phone]   [facility]  [phone]    [facility]/[place]   [phone]    [facility]  [phone]

## 2024-08-28 NOTE — LETTER
Dentist Letter            08/28/24          María Prasad  1985  [address] [address]               We have had several requests from local dentist requesting permission to perform procedures on our patients who are pregnant. We wish to respond with this letter regarding some of the more routine procedures that we have been asked about.    The following procedures may be performed on our obstetric patients:   1. Administration of local anesthesia   2. Administration of antibiotics such as PCN, Ampicillin, and Erythromycin.   3. Administration of pain medications such as Tylenol, Tylenol with codeine, and if needed Percocet.   4. Shielded X-rays    Should you have any questions, please do not hesitate to contact at [phone].        Sincerely,    [facility] Care Team  [phone]

## 2024-08-28 NOTE — PROGRESS NOTES
OB INTAKE INTERVIEW  Pt presents for OB intake. Pt currently incarcerated -  present with pt in room.    OB History    Para Term  AB Living   6 5 5   0 5   SAB IAB Ectopic Multiple Live Births     0     5      # Outcome Date GA Lbr Lamine/2nd Weight Sex Type Anes PTL Lv   6 Current            5 Term 23    M Vag-Spont   NICHOLE   4 Term 22  / 00:13 3130 g (6 lb 14.4 oz) M Vag-Spont EPI  NICHOLE   3 Term 10/29/16 41w0d 06:03 / 00:11 4245 g (9 lb 5.7 oz) F Vag-Spont Local N NICHOLE   2 Term 12/10/13 40w4d  4593 g (10 lb 2 oz) M Vag-Spont EPI N NICHOLE      Birth Comments: Dr Boston Followed Von Willebrands   1 Term 05 39w0d  3062 g (6 lb 12 oz) M Vag-Spont EPI N NICHOLE      Birth Comments: IOL d/t bleeding. Suspected abruption.       Complications: Abruptio Placenta     Hx of  delivery prior to 36 weeks 6 days:  No   If yes, place a referral for cervical surveillance at 16 weeks.   Last Menstrual Period:    Patient's last menstrual period was 03/15/2024 (approximate).     Ultrasound date: 2024  29 weeks 4 days     Estimated Date of Delivery: 24   by US  H&P visit scheduled. 2024 @ 1400  with Dr. Mariee     Last pap smear: unknown date      Current Issues:  Constipation :   No  Headaches :   No  Cramping:  No  Spotting :   No  PICA cravings :  No  FOB Involved:   No  Planned pregnancy:  No  I have these concerns about this prenatal patient:   Currently on Subutex, history of drug use and Von Willebrand disease - seen by [facility] on   History of Abruptio Placenta    Interview education  [facility] Pregnancy Essentials reviewed and discussed   [facility] Handout  [facility] Handout  Discussed genetic testing - previously seen by [facility]  Prenatal lab work:  All paperwork given to   Influenza vaccine given today: No  Discussed Tdap vaccine.   Immunizations:   Immunization History   Administered Date(s) Administered    TD (adult) Preservative Free  06/11/2018     Depression Screening Follow-up Plan: Patient's depression screening was negative with an [place] score of  2  Clinically patient does not have depression. No treatment is required..  Nurse/Family Partnership- referral placed:  No   If yes, place referral for nurse family partnership  BMI Counseling: There is no height or weight on file to calculate BMI.   Tobacco Cessation Counseling: former   The numerous health risks of tobacco consumption were discussed.   Infection Screening: Does the pt have a hx of MRSA? No  If yes- please follow MRSA protocol and obtain a nasal swab for MRSA culture  The patient was oriented to our practice and all questions were answered.  Interviewed by: Fallon Roche RN 08/28/24

## 2024-08-28 NOTE — PROGRESS NOTES
OB/GYN  PRENATAL H&P VISIT  Maíra Prasad  2024  1:50 PM  Dr. Berta Payne      SUBJECTIVE  Patient is a  at 30w5d here for initial prenatal H&P. This is an unintended but desired pregnancy.     She is currently doing well. She is currently incarcerated.     She denies hx of STD/STI, denies a hx of TB or close contacts with persons with TB. She has never had MRSA.     She denies a family history of inheritable conditions such as physical or intellectual disabilities, birth defects, heart or neural tube defects.  She does have known personal history of Von Willebrand disease.    She denies recent travel or travel planned in the near future.     She denies use of nicotine, does currently take 8mg of suboxone once daily, denies other recreational drug use. She denies use of ETOH.    She denies vaginal bleeding, cramping, leakage, abnormal discharge.     OB History    Para Term  AB Living   6 5 5 0 0 5   SAB IAB Ectopic Multiple Live Births   0 0 0 0 5      # Outcome Date GA Lbr Lamine/2nd Weight Sex Type Anes PTL Lv   6 Current            5 Term 23    M Vag-Spont   NICHOLE   4 Term 22  / 00:13 3130 g (6 lb 14.4 oz) M Vag-Spont EPI  NICHOLE      Name: DOMITILA PRASAD      Apgar1: 8  Apgar5: 9   3 Term 10/29/16 41w0d 06:03 / 00:11 4245 g (9 lb 5.7 oz) F Vag-Spont Local N NICHOLE      Name: DOMITILA GRANDA      Apgar1: 8  Apgar5: 9   2 Term 12/10/13 40w4d  4593 g (10 lb 2 oz) M Vag-Spont EPI N NICHOLE      Birth Comments: Dr Boston Followed Miguel Thurman      Name: Richard    1 Term 05 39w0d  3062 g (6 lb 12 oz) M Vag-Spont EPI N NICHOLE      Birth Comments: IOL d/t bleeding. Suspected abruption.       Complications: Abruptio Placenta      Name: Jeffy       Review of Systems   Constitutional:  Negative for chills, fatigue and fever.   HENT:  Negative for congestion and rhinorrhea.    Respiratory:  Negative for cough and shortness of breath.    Cardiovascular:  Negative for chest  pain and leg swelling.   Gastrointestinal:  Negative for abdominal pain, blood in stool, constipation, diarrhea, nausea and vomiting.   Genitourinary:  Negative for dysuria and hematuria.   Allergic/Immunologic: Positive for environmental allergies and food allergies.   Neurological:  Negative for dizziness, syncope, light-headedness and headaches.   Psychiatric/Behavioral:  Negative for sleep disturbance.        Past Medical History:   Diagnosis Date    Clotting disorder (HCC)     Von Willebrand disease (HCC)        Past Surgical History:   Procedure Laterality Date    CERVICAL BIOPSY  W/ LOOP ELECTRODE EXCISION      DILATION AND CURETTAGE OF UTERUS N/A 2021    Procedure: DILATATION AND CURETTAGE (D&C) FOR RETAINED POC;  Surgeon: Tanya Paige MD;  Location: [facility];  Service: Gynecology    INDUCED       WISDOM TOOTH EXTRACTION         Social History     Socioeconomic History    Marital status:      Spouse name: Not on file    Number of children: Not on file    Years of education: Not on file    Highest education level: Not on file   Occupational History    Not on file   Tobacco Use    Smoking status: Former     Types: Cigarettes    Smokeless tobacco: Never   Vaping Use    Vaping status: Never Used   Substance and Sexual Activity    Alcohol use: Not Currently     Comment: social    Drug use: Not Currently     Comment: opoid use - per pt quit     Sexual activity: Not Currently     Partners: Male     Birth control/protection: OCP   Other Topics Concern    Not on file   Social History Narrative    Not on file     Social Determinants of Health     Financial Resource Strain: Low Risk  (2023)    Received from [facility]    Overall Financial Resource Strain (CARDIA)     Difficulty of Paying Living Expenses: Not very hard   Food Insecurity: No Food Insecurity (2023)    Received from [facility]    Hunger Vital Sign     Worried About Running Out of  Food in the Last Year: Never true     Ran Out of Food in the Last Year: Never true   Transportation Needs: No Transportation Needs (7/26/2023)    Received from [facility]    PRAPARE - Transportation     Lack of Transportation (Medical): No     Lack of Transportation (Non-Medical): No   Physical Activity: Not on file   Stress: Not on file   Social Connections: Not on file   Intimate Partner Violence: Not At Risk (7/26/2023)    Received from [facility]    Humiliation, Afraid, Rape, and Kick questionnaire     Fear of Current or Ex-Partner: No     Emotionally Abused: No     Physically Abused: No     Sexually Abused: No   Housing Stability: Not on file       OBJECTIVE  There were no vitals filed for this visit.  Physical Exam  Constitutional:       General: She is not in acute distress.     Appearance: Normal appearance. She is not ill-appearing or toxic-appearing.   HENT:      Head: Normocephalic and atraumatic.      Right Ear: Tympanic membrane, ear canal and external ear normal. There is no impacted cerumen.      Left Ear: Tympanic membrane, ear canal and external ear normal. There is no impacted cerumen.      Nose: Nose normal. No congestion or rhinorrhea.      Mouth/Throat:      Mouth: Mucous membranes are moist.      Pharynx: Oropharynx is clear. No oropharyngeal exudate or posterior oropharyngeal erythema.   Eyes:      General:         Right eye: No discharge.         Left eye: No discharge.      Conjunctiva/sclera: Conjunctivae normal.      Pupils: Pupils are equal, round, and reactive to light.   Cardiovascular:      Rate and Rhythm: Regular rhythm. Tachycardia present.      Heart sounds: Normal heart sounds.   Pulmonary:      Effort: Pulmonary effort is normal. No respiratory distress.      Breath sounds: Normal breath sounds. No stridor. No wheezing, rhonchi or rales.   Abdominal:      Comments: Abdomen is gravid     Musculoskeletal:         General: Normal range of  motion.      Right lower leg: No edema.      Left lower leg: No edema.   Neurological:      Mental Status: She is alert and oriented to person, place, and time. Mental status is at baseline.   Skin:     General: Skin is warm and dry.      Capillary Refill: Capillary refill takes less than 2 seconds.   Psychiatric:         Mood and Affect: Mood normal.         Behavior: Behavior normal.   Vitals reviewed. Exam conducted with a chaperone present.       ASSESSMENT AND PLAN    38 y.o., , with LMP 03/15/2024 (Approximate) , at 30w5d here for her prenatal H&P.     by doppler, fundal height 30cm    Pregnancy: H&P completed today. PN Labs reviewed today. Labor expectations discussed with patient, nutrition, exercise, medications, sexual intercourse, and nausea/vomiting. Patient's BMI is 26.19.     Screening: Pap smear declined today by patient. GC/CT collected.     Consents: Delivery process including potential OVD and  reviewed. Delivery consent signed today in office.     Labor: For analgesia, patient plans on TBD.    Contraception: Different methods of contraception were discussed with patient, including progesterone only oral pills, depo provera, nexplanon, mirena, and paragard. Patient desires surgical sterilization.    Follow up: RTC in 2 weeks. Precautions regarding labor, leakage, bleeding, and fetal movement reviewed.    Berta Payne  2024  1:50 PM

## 2024-08-28 NOTE — PROGRESS NOTES
28 week education packet provided to patient on 08/28/24.    Included in packet:  Third Trimester paperwork  Delivery consent   Birthing room support person rules and acknowledgment  Birth Plan   Welcome information  Birth certificate worksheet   Consent for Photographers  Perineal/ Vaginal massage   Pediatric practices and locations

## 2024-08-29 LAB
C TRACH DNA SPEC QL NAA+PROBE: NEGATIVE
N GONORRHOEA DNA SPEC QL NAA+PROBE: NEGATIVE

## 2024-09-03 ENCOUNTER — PATIENT OUTREACH (OUTPATIENT)
Dept: OBGYN CLINIC | Facility: CLINIC | Age: 39
End: 2024-09-03

## 2024-09-03 NOTE — PROGRESS NOTES
[facility] was unable to contact pt by phone due to incarceration. [facility] will otherwise close this referral due to unable to reach pt. [facility] will remain available if pt continues care at [facility]

## 2024-09-09 PROBLEM — Z3A.32 32 WEEKS GESTATION OF PREGNANCY: Status: ACTIVE | Noted: 2024-08-28

## 2024-09-12 ENCOUNTER — ROUTINE PRENATAL (OUTPATIENT)
Dept: OBGYN CLINIC | Facility: CLINIC | Age: 39
End: 2024-09-12

## 2024-09-12 VITALS
DIASTOLIC BLOOD PRESSURE: 73 MMHG | RESPIRATION RATE: 18 BRPM | HEIGHT: 65 IN | HEART RATE: 96 BPM | SYSTOLIC BLOOD PRESSURE: 112 MMHG | BODY MASS INDEX: 26.16 KG/M2 | WEIGHT: 157 LBS

## 2024-09-12 DIAGNOSIS — Z3A.32 32 WEEKS GESTATION OF PREGNANCY: ICD-10-CM

## 2024-09-12 DIAGNOSIS — Z30.2 REQUEST FOR STERILIZATION: Primary | ICD-10-CM

## 2024-09-12 DIAGNOSIS — Z01.84 LACK OF IMMUNITY TO HEPATITIS B VIRUS DEMONSTRATED BY SEROLOGIC TEST: ICD-10-CM

## 2024-09-12 DIAGNOSIS — D68.00 VON WILLEBRAND DISEASE (HCC): ICD-10-CM

## 2024-09-12 PROCEDURE — 99213 OFFICE O/P EST LOW 20 MIN: CPT | Performed by: NURSE PRACTITIONER

## 2024-09-12 NOTE — PROGRESS NOTES
[facility]  OB/GYN prenatal visit    S: 38 y.o.  32w3d here for PN visit. She has no obstetric complaints, including pelvic pain, contractions, vaginal bleeding, loss of fluid, or decreased fetal movement.   Patient reports out of alf x 1 week, she is here with FOB  On subutex 8 mgs from [facility]   Hx of vonWillebrand dz, has  VWF antigen and factor Vlll activity labs ordered- pt needs to complete  Needs 1 hr GTT  Hx of abruption  O:  Vitals:    24 1555   BP: 112/73   Pulse: 96   Resp: 18       Gen: no acute distress, nonlabored breathing  Fundal Height (cm): 32 cm  Fetal Heart Rate: 129    A/P:      IUP at 32w3d  No obstetric complaints today  US 24, breech, EFW 39 %, growth scan in 4 wks- pt needs to schedule.   Vaccinations: Information given on RSV, has allergy to Tdap  Contraception: sterilization, SHERLEY Orlando signed 24  Breastfeeding: no  Birth plan:  with epidural.   Discussed  labor precautions and fetal kick counts    Return to clinic in 2 weeks    Problem List          Hematopoietic and Hemostatic    Von Willebrand disease (HCC)    Overview     Patient needs to complete  labs for VWF antigen and factor Vlll            Obstetrics/Gynecology    32 weeks gestation of pregnancy    Overview     Overview: 24-patient out of alf 1 week ago  Labs  Pap smear Not on file- pap postpartum   and GC/CT 24 negative  Prenatal panel  MSAFP [  too late ]  Genetic screening [  ]  28w labs [  ] needs to complete 1 hr GTT  Vaccines:  Flu vaccine:   Covid vaccine: none  Tdap vaccine: allergy  RSV vaccine: offer [id number] given to patient  RhoGAM [  ]  Contraception: tubal SHERLEY Orlando signed 2024, pills to bridge  Feeding plan: bottle feed  Birth plan:  with epidural  Delivery consent: to be signed at 28w- 24  Ultrasounds: 24, breech, EFW 39%, growth scan in 4 weeks-patient needs to schedule         Late prenatal care       Other    Request for  sterilization    Overview     MA 31 signed 9/12/24         Lack of immunity to hepatitis B virus demonstrated by serologic test        MERI Sanchez  9/12/2024  4:23 PM

## 2024-09-17 ENCOUNTER — APPOINTMENT (OUTPATIENT)
Dept: LAB | Facility: CLINIC | Age: 39
End: 2024-09-17
Payer: OTHER GOVERNMENT

## 2024-09-17 DIAGNOSIS — Z3A.32 32 WEEKS GESTATION OF PREGNANCY: ICD-10-CM

## 2024-09-17 DIAGNOSIS — O99.113 COAGULATION DEFECT COMPLICATING PREGNANCY, THIRD TRIMESTER (HCC): ICD-10-CM

## 2024-09-17 DIAGNOSIS — D68.00 VON WILLEBRAND DISEASE (HCC): ICD-10-CM

## 2024-09-17 DIAGNOSIS — D68.9 COAGULATION DEFECT COMPLICATING PREGNANCY, THIRD TRIMESTER (HCC): ICD-10-CM

## 2024-09-17 DIAGNOSIS — O99.810 ABNORMAL GLUCOSE TOLERANCE TEST (GTT) DURING PREGNANCY, ANTEPARTUM: Primary | ICD-10-CM

## 2024-09-17 LAB — GLUCOSE 1H P 50 G GLC PO SERPL-MCNC: 162 MG/DL (ref 70–134)

## 2024-09-17 PROCEDURE — 36415 COLL VENOUS BLD VENIPUNCTURE: CPT

## 2024-09-17 PROCEDURE — 82950 GLUCOSE TEST: CPT

## 2024-09-18 ENCOUNTER — TELEPHONE (OUTPATIENT)
Dept: OBGYN CLINIC | Facility: CLINIC | Age: 39
End: 2024-09-18

## 2024-09-18 NOTE — TELEPHONE ENCOUNTER
Attempted to call, left a message asking for patient to call the office regarding test results and recommendations. Office number provided in message. Patient does not have my chart.

## 2024-09-18 NOTE — TELEPHONE ENCOUNTER
----- Message from MERI Caldwell sent at 9/17/2024  8:41 PM EDT -----  Please  inform pt that her 1 hr GTT was abnormal and she needs to complete a fasting 3 hr GTT, order was placed.  Thanks!

## 2024-09-20 ENCOUNTER — TELEPHONE (OUTPATIENT)
Dept: OBGYN CLINIC | Facility: CLINIC | Age: 39
End: 2024-09-20

## 2024-09-23 NOTE — TELEPHONE ENCOUNTER
Third and final attempt made to reach patient. Patient does not have my chart. Will send letter to her home.

## 2024-09-27 ENCOUNTER — ROUTINE PRENATAL (OUTPATIENT)
Dept: OBGYN CLINIC | Facility: CLINIC | Age: 39
End: 2024-09-27

## 2024-09-27 VITALS
HEIGHT: 65 IN | DIASTOLIC BLOOD PRESSURE: 68 MMHG | SYSTOLIC BLOOD PRESSURE: 105 MMHG | BODY MASS INDEX: 26.49 KG/M2 | HEART RATE: 101 BPM | WEIGHT: 159 LBS | RESPIRATION RATE: 18 BRPM

## 2024-09-27 DIAGNOSIS — Z3A.35 35 WEEKS GESTATION OF PREGNANCY: Primary | ICD-10-CM

## 2024-09-27 PROCEDURE — 99213 OFFICE O/P EST LOW 20 MIN: CPT | Performed by: OBSTETRICS & GYNECOLOGY

## 2024-09-27 NOTE — PROGRESS NOTES
María Prasad presents today for routine OB visit at 35w0d.  Blood Pressure: 105/68  Wt=72.1 kg (159 lb); Body mass index is 26.46 kg/m².; TWG=Not found.  Fetal Heart Rate: 147; Fundal Height (cm): 33 cm  Abdomen: gravid, soft, non-tender.  She reports feeling well and denies nausea/vomiting/headache/.  Denies uterine contractions.  Denies vaginal bleeding or leaking of fluid.  Reports adequate fetal movement of at least 10 movements in 2 hours once daily.  Scheduled for ultrasound 2024 for growth scan.  Reviewed premature labor precautions and fetal kick counts.    Plan:  Advised to continue medications and return in 1 weeks.  Will complete 3 hour OGTT this weekend along with VWF and Factor 8 labs  She will consider RSV vaccination at next appointment  Still planning on formula feeding and sterilization s/p delivery    Current Outpatient Medications   Medication Instructions    acetaminophen (TYLENOL) 650 mg, Every 6 hours PRN    buprenorphine (SUBUTEX) 8 mg Sublingual, Daily    docusate sodium (COLACE) 100 mg, 2 times daily    ferrous sulfate 325 mg, Daily with breakfast    Multiple Vitamins-Minerals (CENTRUM ADULTS PO) 1 tablet, Daily    Prenatal Vit-Fe Fumarate-FA (PRENATAL PO) Oral         G6 Problems (from 24 to present)       Problem Noted Resolved    35 weeks gestation of pregnancy 2024 by Berta Payne,  No    Overview Addendum 2024  8:41 PM by MERI Caldwell     Overview:   Labs  Pap smear Not on file- pap postpartum   and GC/CT 24 negative  Prenatal panel  MSAFP [  too late ]  Genetic screening [  ]  28w labs [ ]1  hr GTT- 162, needs to complete 3hr GTT  Vaccines:  Flu vaccine: declined   Covid vaccine: none  Tdap vaccine: allergy  RSV vaccine: offer [id number] given to patient, declined   RhoGAM [ not indicated ]  Contraception: tubal MA 31 signed 2024, pills to bridge  Feeding plan: bottle feed  Birth plan:  with epidural  Delivery consent:  signed 8/29/24   Ultrasounds: 8/20/24, breech, EFW 39%; growth scan scheduled 9/30/24           Von Willebrand disease (HCC) 8/5/2024 by Lucie Mariee MD No    Overview Signed 9/12/2024  4:32 PM by MERI Caldwell     Patient needs to complete  labs for VWF antigen and factor Ian Dunn, MS3     Leann De La Cruz MD    PGY-3

## 2024-09-30 ENCOUNTER — ULTRASOUND (OUTPATIENT)
Dept: PERINATAL CARE | Facility: CLINIC | Age: 39
End: 2024-09-30
Payer: MEDICARE

## 2024-09-30 VITALS
HEIGHT: 65 IN | BODY MASS INDEX: 26.52 KG/M2 | WEIGHT: 159.2 LBS | OXYGEN SATURATION: 98 % | DIASTOLIC BLOOD PRESSURE: 64 MMHG | HEART RATE: 76 BPM | SYSTOLIC BLOOD PRESSURE: 118 MMHG

## 2024-09-30 DIAGNOSIS — Z3A.35 35 WEEKS GESTATION OF PREGNANCY: Primary | ICD-10-CM

## 2024-09-30 DIAGNOSIS — F11.20 SUBOXONE MAINTENANCE TREATMENT COMPLICATING PREGNANCY, ANTEPARTUM (HCC): ICD-10-CM

## 2024-09-30 DIAGNOSIS — D68.00 VON WILLEBRAND DISEASE (HCC): ICD-10-CM

## 2024-09-30 DIAGNOSIS — Z36.2 ENCOUNTER FOR FOLLOW-UP ULTRASOUND OF FETAL ANATOMY: ICD-10-CM

## 2024-09-30 DIAGNOSIS — O09.523 MULTIGRAVIDA OF ADVANCED MATERNAL AGE IN THIRD TRIMESTER: ICD-10-CM

## 2024-09-30 DIAGNOSIS — O09.30 LATE PRENATAL CARE: ICD-10-CM

## 2024-09-30 DIAGNOSIS — Z36.89 ENCOUNTER FOR ULTRASOUND TO CHECK FETAL GROWTH: ICD-10-CM

## 2024-09-30 DIAGNOSIS — O99.320 SUBOXONE MAINTENANCE TREATMENT COMPLICATING PREGNANCY, ANTEPARTUM (HCC): ICD-10-CM

## 2024-09-30 PROCEDURE — 99214 OFFICE O/P EST MOD 30 MIN: CPT | Performed by: STUDENT IN AN ORGANIZED HEALTH CARE EDUCATION/TRAINING PROGRAM

## 2024-09-30 PROCEDURE — 76816 OB US FOLLOW-UP PER FETUS: CPT | Performed by: STUDENT IN AN ORGANIZED HEALTH CARE EDUCATION/TRAINING PROGRAM

## 2024-09-30 NOTE — PROGRESS NOTES
"[facility]: Ms. Prasad was seen today for fetal growth and followup missed anatomy ultrasound.  See ultrasound report under \"OB Procedures\" tab.      MDM:   I. Diagnoses/Problems addressed:  Stable chronic illness: suboxone maintenance, vonWillebrand disease  II.  Data:  moderate  III.  Risk of morbidity: Low    Please don't hesitate to contact our office with any concerns or questions.  -Jannette Byrd MD    "

## 2024-10-01 ENCOUNTER — APPOINTMENT (OUTPATIENT)
Dept: LAB | Facility: CLINIC | Age: 39
End: 2024-10-01
Payer: MEDICARE

## 2024-10-01 DIAGNOSIS — D68.00 VON WILLEBRAND DISEASE (HCC): ICD-10-CM

## 2024-10-01 DIAGNOSIS — O99.810 ABNORMAL GLUCOSE TOLERANCE TEST (GTT) DURING PREGNANCY, ANTEPARTUM: ICD-10-CM

## 2024-10-01 LAB
ERYTHROCYTE [DISTWIDTH] IN BLOOD BY AUTOMATED COUNT: 12.8 % (ref 11.6–15.1)
GLUCOSE 1H P 100 G GLC PO SERPL-MCNC: 157 MG/DL (ref 65–179)
GLUCOSE 2H P 100 G GLC PO SERPL-MCNC: 136 MG/DL (ref 65–154)
GLUCOSE 3H P 100 G GLC PO SERPL-MCNC: 133 MG/DL (ref 65–139)
GLUCOSE P FAST SERPL-MCNC: 83 MG/DL (ref 65–94)
HCT VFR BLD AUTO: 38.7 % (ref 34.8–46.1)
HGB BLD-MCNC: 12.3 G/DL (ref 11.5–15.4)
MCH RBC QN AUTO: 29.1 PG (ref 26.8–34.3)
MCHC RBC AUTO-ENTMCNC: 31.8 G/DL (ref 31.4–37.4)
MCV RBC AUTO: 92 FL (ref 82–98)
PLATELET # BLD AUTO: 130 THOUSANDS/UL (ref 149–390)
PMV BLD AUTO: 12 FL (ref 8.9–12.7)
RBC # BLD AUTO: 4.22 MILLION/UL (ref 3.81–5.12)
WBC # BLD AUTO: 8.24 THOUSAND/UL (ref 4.31–10.16)

## 2024-10-01 PROCEDURE — 85246 CLOT FACTOR VIII VW ANTIGEN: CPT

## 2024-10-01 PROCEDURE — 85240 CLOT FACTOR VIII AHG 1 STAGE: CPT

## 2024-10-01 PROCEDURE — 85027 COMPLETE CBC AUTOMATED: CPT

## 2024-10-01 PROCEDURE — 82951 GLUCOSE TOLERANCE TEST (GTT): CPT

## 2024-10-01 PROCEDURE — 36415 COLL VENOUS BLD VENIPUNCTURE: CPT

## 2024-10-01 PROCEDURE — 82952 GTT-ADDED SAMPLES: CPT

## 2024-10-02 ENCOUNTER — TELEPHONE (OUTPATIENT)
Dept: OBGYN CLINIC | Facility: CLINIC | Age: 39
End: 2024-10-02

## 2024-10-02 NOTE — TELEPHONE ENCOUNTER
Attempted to call, left a message on phone number listed on communication consent form with test results, ok per communication consent form. Office number provided in message in case of any questions or concerns.

## 2024-10-02 NOTE — TELEPHONE ENCOUNTER
----- Message from MERI Molina sent at 10/2/2024  8:04 AM EDT -----  Please let her know she passed her 3 hour glucose test. Thank you!  ----- Message -----  From: Lab, Background User  Sent: 10/1/2024   8:29 PM EDT  To: MERI Caldwell

## 2024-10-03 LAB
FACT XIIIA PPP-ACNC: 128 % (ref 56–140)
VWF AG ACT/NOR PPP IA: 181 % (ref 50–200)

## 2024-10-10 ENCOUNTER — TELEPHONE (OUTPATIENT)
Age: 39
End: 2024-10-10

## 2024-10-10 NOTE — TELEPHONE ENCOUNTER
Patient called in, is [facility]. She can't get there today and would like to be scheduled tomorrow. She couldn't not get through to [facility]

## 2024-10-11 ENCOUNTER — ROUTINE PRENATAL (OUTPATIENT)
Dept: OBGYN CLINIC | Facility: CLINIC | Age: 39
End: 2024-10-11

## 2024-10-11 VITALS
WEIGHT: 160.6 LBS | SYSTOLIC BLOOD PRESSURE: 100 MMHG | BODY MASS INDEX: 26.76 KG/M2 | HEIGHT: 65 IN | DIASTOLIC BLOOD PRESSURE: 66 MMHG | HEART RATE: 102 BPM

## 2024-10-11 DIAGNOSIS — O09.523 MULTIGRAVIDA OF ADVANCED MATERNAL AGE IN THIRD TRIMESTER: ICD-10-CM

## 2024-10-11 DIAGNOSIS — Z3A.37 37 WEEKS GESTATION OF PREGNANCY: Primary | ICD-10-CM

## 2024-10-11 DIAGNOSIS — D68.00 VON WILLEBRAND DISEASE (HCC): ICD-10-CM

## 2024-10-11 PROCEDURE — 99213 OFFICE O/P EST LOW 20 MIN: CPT | Performed by: OBSTETRICS & GYNECOLOGY

## 2024-10-11 PROCEDURE — 87150 DNA/RNA AMPLIFIED PROBE: CPT

## 2024-10-11 RX ORDER — BUPRENORPHINE HYDROCHLORIDE AND NALOXONE HYDROCHLORIDE DIHYDRATE 8; 2 MG/1; MG/1
TABLET SUBLINGUAL
COMMUNITY
Start: 2024-10-01

## 2024-10-11 NOTE — PROGRESS NOTES
María Prasad presents today for routine OB visit at 37w0d.  Blood Pressure: 100/66  Wt=72.8 kg (160 lb 9.6 oz); Body mass index is 26.73 kg/m².; TWG=Not found.  Fetal Heart Rate: 146; Fundal Height (cm): 37 cm  Abdomen: gravid, soft, non-tender.  She reports no concerns.  Denies uterine contractions.  Denies vaginal bleeding or leaking of fluid.  Reports adequate fetal movement of at least 10 movements in 2 hours once daily.  Reviewed premature labor precautions and fetal kick counts.  Advised to continue medications and return in 1 week.      Current Outpatient Medications   Medication Instructions    buprenorphine (SUBUTEX) 8 mg Sublingual, Daily    buprenorphine-naloxone (SUBOXONE) 8-2 mg per SL tablet DISSOLVE 1 TABLET UNDER THE TONGUE TWICE DAILY    docusate sodium (COLACE) 100 mg, 2 times daily    ferrous sulfate 325 mg, Daily with breakfast    naloxone (NARCAN) 4 mg/0.1 mL nasal spray USE ONE SPRAY INTRANASALLY AS DIRECTED    Prenatal Vit-Fe Fumarate-FA (PRENATAL PO) Oral         G6 Problems (from 24 to present)       Problem Noted Resolved    37 weeks gestation of pregnancy 2024 by Berta Payne, DO No    Overview Addendum 2024 10:59 AM by Leann De La Cruz MD     Overview:   Labs  Pap smear Not on file- pap postpartum   and GC/CT 24 negative  Prenatal panel  MSAFP [  too late ]  Genetic screening [  ]  28w labs [  ]  1 hr GTT- 162, 3hr   GBS collected 10/11/24  Vaccines:  Flu vaccine: declined   Covid vaccine: none  Tdap vaccine: allergy  RSV vaccine: offer [id number] given to patient, declined   RhoGAM [ not indicated ]  Contraception: tubal MA 31 signed 2024, pills to bridge  Feeding plan: bottle feed  Birth plan:  with epidural  Delivery consent: signed 24  Ultrasounds: 24, breech, EFW 39%, growth scan 24 demonstrated vertex presentation with normal fetal growth          Von Willebrand disease (HCC) 2024 by Lucie Mariee MD  No    Overview Addendum 2024 12:28 PM by Jannette Byrd MD     Patient needs to complete  labs for VWF antigen and factor Vlll  ASAP heme referral placed     Fetal recs: avoidance of fetal scalp electrode and operative vaginal delivery. Newborns that have a risk of only mild von Willebrand's can receive normal  care but elective procedures such as circumcision should be deferred until the von Willebrand's factor and factor 8 activity levels have been confirmed.                 Leann De La Cruz MD    PGY-3

## 2024-10-14 LAB — GP B STREP DNA SPEC QL NAA+PROBE: NEGATIVE

## 2024-10-14 NOTE — PROGRESS NOTES
Ambulatory Visit  Name: María Prasad      : 1985      MRN: [id number]  Encounter Provider: Padmini Boateng MD  Encounter Date: 10/15/2024   Encounter department: [facility] [place]    Assessment & Plan  Von Willebrand disease (HCC)           History of Present Illness   {?Quick Links Encounters * My Last Note * Last Note in Specialty * Snapshot * Since Last Visit * History :34905}  María Prasad is a 39 y.o. female who presents ***    {History obtained from (Optional):47004}  Review of Systems  {Select to Display PMH (Optional):67577}      Objective   {?Quick Links Trend Vitals * Enter New Vitals * Results Review * Timeline (Adult) * Labs * Imaging * Cardiology * Procedures * Lung Cancer Screening * Surgical eConsent :97769}  LMP 03/15/2024 (Approximate)     Physical Exam  {Administrative / Billing Section (Optional):31954}

## 2024-10-15 ENCOUNTER — TELEPHONE (OUTPATIENT)
Dept: OBGYN CLINIC | Facility: CLINIC | Age: 39
End: 2024-10-15

## 2024-10-15 ENCOUNTER — CONSULT (OUTPATIENT)
Dept: HEMATOLOGY ONCOLOGY | Facility: CLINIC | Age: 39
End: 2024-10-15
Payer: MEDICARE

## 2024-10-15 VITALS
BODY MASS INDEX: 27.02 KG/M2 | TEMPERATURE: 97.7 F | WEIGHT: 162.2 LBS | RESPIRATION RATE: 18 BRPM | DIASTOLIC BLOOD PRESSURE: 78 MMHG | HEART RATE: 103 BPM | SYSTOLIC BLOOD PRESSURE: 118 MMHG | HEIGHT: 65 IN | OXYGEN SATURATION: 98 %

## 2024-10-15 DIAGNOSIS — D68.00 VON WILLEBRAND DISEASE (HCC): Primary | ICD-10-CM

## 2024-10-15 PROCEDURE — 99244 OFF/OP CNSLTJ NEW/EST MOD 40: CPT | Performed by: INTERNAL MEDICINE

## 2024-10-15 RX ORDER — TRANEXAMIC ACID 650 MG/1
1300 TABLET ORAL 3 TIMES DAILY
Qty: 60 TABLET | Refills: 0 | Status: SHIPPED | OUTPATIENT
Start: 2024-10-15 | End: 2024-10-25

## 2024-10-15 NOTE — TELEPHONE ENCOUNTER
----- Message from Leann De La Cruz MD sent at 10/15/2024 11:05 AM EDT -----  Please call patient and let her know that her group B strep test was negative.     Thank you!

## 2024-10-15 NOTE — PROGRESS NOTES
HEMATOLOGY ONCOLOGY STAFF PHYSICIAN ATTESTATION   I have personally interviewed and examined María Prasad with  Dr. Drew Guidry. I have reviewed and discussed the HPI, assessment, and oncologic management plan formulated by Dr. Guidry. I concur with his assessment and management plan.    Mrs. Osmar Daniel is a 39-year-old  female 38 weeks pregnant with a diagnosis of von Willebrand's disease.  She comes to hematology clinic for management recommendations and prevention of bleeding in a patient with von Willebrand's disease, at risk for shannon-partum bleeding. She has a higher risk of bleeding in the post-partum period, when we expect a decrease in the levels of vW factor antigen, vW activity, and factor VIII. Her most recent von Willebrand factor, von Willebrand activity and factor VIII activity levels were above 100%:    vW Disease laboratory testing 10/01/2024:  vW factor antigen = 181%  vW factor activity =   Factor VIII activity = 128%    Based on the results above, the patient does not require factor replacement therapy at this point in time.  However, she may benefit from antifibrinolytic therapy with an agent such as tranexamic acid in the peripartum period.  A prescription for tranexamic acid was sent to her pharmacy. Duration of therapy should be at least for 7 days after delivery. We will coordinate the care of the patient with her OB/GYN physician. The patient will return to hematology clinic as needed.    Patient understands and agrees with my management recommendations and plan of care. I answered questions to her satisfaction.        Padmini Boateng MD

## 2024-10-15 NOTE — PATIENT INSTRUCTIONS
We would like you to get your CBC and iron panel checked at your convenience to make sure your blood counts and iron storage is adequate. We would like you to  the tranexamic acid from your local pharmacy. Please take 2 tablets every 8 hours starting around your post delivery time. This is to be taken for 10 days total. Please make sure you take this medication to the hospital when you go for your delivery.     Please follow-up in office the last week of December with repeat bloodwork, including your von willebrand profile, the week prior to follow-up.

## 2024-10-15 NOTE — PROGRESS NOTES
Hematology Outpatient Consult Note  María Prasad 39 y.o. female MRN: @ Encounter: [id number]      Date:  10/15/2024    Assessment/ Plan:      1. Von Willebrand disease (HCC)  von Willebrand Profile    CBC and differential    Iron Panel (Includes Ferritin, Iron Sat%, Iron, and TIBC)    Tranexamic Acid 650 MG TABS    Ambulatory referral to Hematology / Oncology        Ms. Prasad is a 39 year old female with likely type 1 von Williebrand disease who is presenting to hematology clinic for recommendations in context of pregnancy. With regards to patient's vwD history, she used to have heavy menstrual cycles, epistaxis, and bleeding complications with dental procedures since childhood, however she wasn't officially diagnosed with vWD until her first delivery in 2005, complicated by significant postpartum hemorrhage. Subsequent four deliveries did have minimal bleeding complications. Patient does not recall prior treatment details and I am not seeing any formal hematology notes from care everywhere.     Patient's most recent vWD antigen and factor 8 activity from 10/1/24 are 181% and 128%, respectively, likely elevated from true baseline levels 2/2 current pregnancy and estrogen effect. With this level of vWD and factor VIII activity, it is not necessary to prophylactically give any vWF or factor VIII concentrates prior to delivery. A VWF activity level of >=50 IU/dL is considered adequate for neuraxial anesthesia. vWF and factor VIII activity can be checked on admission and for about 3-5 days post delivery, although please note that it will take several days for us to get the results.     Patient was recommended to take tranexamic acid 1300 mg tablet every 8 hours for about 10 days post delivery to prophylactically reduce any chances of bleeding complications. If patient were to develop any bleeding complications during the upcoming delivery, we do have inpatient hematology team available at [facility] (where  patient is planning to deliver the baby) to give recommendations regarding management with vWF and factor VIII concentrates.     Patient was recommended to get an iron panel checked at her convenience to evaluate for any ongoing iron deficiency. Additionally, patient was recommended to get a repeat CBC, iron panel, and full vWD profile checked about 2 months post delivery. A vWD profile 6 weeks post delivery should give us an accurate baseline. She was asked to follow-up in office around last week of December with bloodwork the week prior.     Summary of recommendations:   Check iron panel to evaluate iron storage and need for iron supplementation  TXA 1300 mg TID x 10 days post delivery   Ob/Gyn to check CBC and coagulation parameters (PT/PTT) on admission  Can check vWF and factor VIII activity on admission and for 3-5 days post delivery if Ob/Gyn team desires, however please note that it takes several days for us to get the results in our network  Follow-up in office in 2 months with CBC, iron panel, and vWD profile    Labs and imaging studies are reviewed by ordering provider once results are available. If there are findings that need immediate attention, you will be contacted when results available. Discussing results and the implication on your healthcare is best discussed in person at your follow-up visit.       CC:  von Willebrand disease    HPI:  María Prasad is a 39 yea old female with hx of von Willebrand disease.     As a child, patient remembers getting heavy menstrual cycles, frequent nosebleeds, and experiencing significant bleeding with any dental procedures. However, she was not formally diagnosed with von Willebrand disease until she delivered her first baby in 2005. Her first delivery was complicated by significant postpartum bleeding. She remembers getting an IV medication, but does not recall what exactly was the treatment.  Patient remembers having some minimal bleeding issues with her  subsequent deliveries, but does not recall what was done for management. She has only required blood transfusion once when she was dealing with complications 2/2 a miscarriage about 5 years ago. Patient has delivered total 5 babies thus far with most recent delivery being in June 2023.     She is currently 37 weeks pregnant with her 6th baby with expected due date of 11/1/24. She is planning to deliver at the [facility]. She is not planning on breast feeding.     Patient had recent vWD antigen and factor 8 activity checked on 10/1/24, 181% and 128%, respectively. Patient appears to have type 1 von Willebrand disease based on previous documentation and prior levels.    Family history: vWD and lung cancer in father      Molecular Testing:         Test Results:    Imaging: .No results found.    Labs:   Lab Results   Component Value Date    WBC 8.24 10/01/2024    HGB 12.3 10/01/2024    HCT 38.7 10/01/2024    MCV 92 10/01/2024     (L) 10/01/2024     Lab Results   Component Value Date    K 4.3 06/22/2022     (H) 06/22/2022    CO2 25 06/22/2022    BUN 9 06/22/2022    CREATININE 0.46 06/22/2022    GLUF 83 10/01/2024    CALCIUM 8.6 06/22/2022    CORRECTEDCA 8.6 05/04/2021    AST 18 06/22/2022    ALT 26 06/22/2022    ALKPHOS 184 (H) 06/22/2022    EGFR 128 06/22/2022         ROS: Review of Systems   Constitutional:  Negative for appetite change, chills, fatigue, fever and unexpected weight change.   HENT:   Negative for mouth sores, nosebleeds, sore throat, trouble swallowing and voice change.    Respiratory:  Negative for chest tightness, hemoptysis, shortness of breath and wheezing.    Gastrointestinal:  Negative for abdominal pain, constipation, diarrhea, nausea and vomiting.   Genitourinary:  Negative for difficulty urinating, dysuria, frequency, hematuria and vaginal bleeding.    Musculoskeletal:  Negative for arthralgias, flank pain, gait problem and myalgias.   Skin:  Negative for itching, rash and  wound.   Neurological:  Negative for dizziness, extremity weakness, gait problem, headaches, numbness, seizures and speech difficulty.   Hematological:  Negative for adenopathy. Does not bruise/bleed easily.   Psychiatric/Behavioral:  Negative for confusion. The patient is not nervous/anxious.       Active Problems:   Patient Active Problem List   Diagnosis    Request for sterilization    Von Willebrand disease (HCC)    37 weeks gestation of pregnancy    Late prenatal care    Lack of immunity to hepatitis B virus demonstrated by serologic test    Suboxone maintenance treatment complicating pregnancy, antepartum (HCC)       Past Medical History:   Past Medical History:   Diagnosis Date    Von Willebrand disease (HCC)        Surgical History:   Past Surgical History:   Procedure Laterality Date    CERVICAL BIOPSY  W/ LOOP ELECTRODE EXCISION      DILATION AND CURETTAGE OF UTERUS N/A 2021    Procedure: DILATATION AND CURETTAGE (D&C) FOR RETAINED POC;  Surgeon: Tanya Paige MD;  Location: [facility];  Service: Gynecology    INDUCED       WISDOM TOOTH EXTRACTION         Family History:    Family History   Problem Relation Age of Onset    Lung cancer Father     Von Willebrand disease Father     No Known Problems Maternal Grandmother     No Known Problems Daughter     No Known Problems Son     No Known Problems Son     No Known Problems Son     No Known Problems Son        Cancer-related family history includes Lung cancer in her father.    Social History:   Social History     Socioeconomic History    Marital status:      Spouse name: Not on file    Number of children: Not on file    Years of education: Not on file    Highest education level: Not on file   Occupational History    Not on file   Tobacco Use    Smoking status: Former     Types: Cigarettes    Smokeless tobacco: Never   Vaping Use    Vaping status: Never Used   Substance and Sexual Activity    Alcohol use: Not Currently     Comment: social     Drug use: Not Currently     Types: Cocaine, Amphetamines     Comment: Per chart and opoid use - per pt quit 2022    Sexual activity: Not Currently     Partners: Male     Birth control/protection: OCP   Other Topics Concern    Not on file   Social History Narrative    Not on file     Social Determinants of Health     Financial Resource Strain: Low Risk  (7/26/2023)    Received from [facility]    Overall Financial Resource Strain (CARDIA)     Difficulty of Paying Living Expenses: Not very hard   Food Insecurity: No Food Insecurity (7/26/2023)    Received from [facility]    Hunger Vital Sign     Worried About Running Out of Food in the Last Year: Never true     Ran Out of Food in the Last Year: Never true   Transportation Needs: No Transportation Needs (7/26/2023)    Received from [facility]    PRAPARE - Transportation     Lack of Transportation (Medical): No     Lack of Transportation (Non-Medical): No   Physical Activity: Not on file   Stress: Not on file   Social Connections: Not on file   Intimate Partner Violence: Not At Risk (7/26/2023)    Received from [facility]    Humiliation, Afraid, Rape, and Kick questionnaire     Fear of Current or Ex-Partner: No     Emotionally Abused: No     Physically Abused: No     Sexually Abused: No   Housing Stability: Not on file       Current Medications:   Current Outpatient Medications   Medication Sig Dispense Refill    buprenorphine (SUBUTEX) 8 mg Place under the tongue daily      buprenorphine-naloxone (SUBOXONE) 8-2 mg per SL tablet DISSOLVE 1 TABLET UNDER THE TONGUE TWICE DAILY      Prenatal Vit-Fe Fumarate-FA (PRENATAL PO) Take by mouth      Tranexamic Acid 650 MG TABS Take 2 tablets (1,300 mg total) by mouth 3 (three) times a day for 10 days 60 tablet 0    docusate sodium (COLACE) 100 mg capsule Take 100 mg by mouth 2 (two) times a day (Patient not taking: Reported on 9/12/2024)      ferrous  sulfate 325 (65 Fe) mg tablet Take 325 mg by mouth daily with breakfast (Patient not taking: Reported on 9/27/2024)      naloxone (NARCAN) 4 mg/0.1 mL nasal spray USE ONE SPRAY INTRANASALLY AS DIRECTED (Patient not taking: Reported on 10/11/2024)       No current facility-administered medications for this visit.       Allergies:   Allergies   Allergen Reactions    Penicillins Hives    Bee Venom Throat Swelling    Beeswax Throat Swelling    Penicillin V Hives    Pertussis Vaccine Hives and Other (See Comments)    Pertussis Vaccines Hives    Pineapple - Food Allergy Swelling    Pollen Extract Itching     Itching, horse voice, headaches     Physical Exam:    Body surface area is 1.81 meters squared.    Wt Readings from Last 3 Encounters:   10/15/24 73.6 kg (162 lb 3.2 oz)   10/11/24 72.8 kg (160 lb 9.6 oz)   09/30/24 72.2 kg (159 lb 3.2 oz)        Temp Readings from Last 3 Encounters:   10/15/24 97.7 °F (36.5 °C) (Temporal)   08/19/23 98 °F (36.7 °C) (Oral)   05/05/21 99 °F (37.2 °C)        BP Readings from Last 3 Encounters:   10/15/24 118/78   10/11/24 100/66   09/30/24 118/64         Pulse Readings from Last 3 Encounters:   10/15/24 103   10/11/24 102   09/30/24 76     @LASTSAO2(3)@    Physical Exam  Vitals reviewed.   Constitutional:       General: She is not in acute distress.     Appearance: She is not ill-appearing, toxic-appearing or diaphoretic.   HENT:      Head: Normocephalic and atraumatic.      Mouth/Throat:      Mouth: Mucous membranes are moist.      Pharynx: No oropharyngeal exudate or posterior oropharyngeal erythema.   Eyes:      General: No scleral icterus.     Extraocular Movements: Extraocular movements intact.      Conjunctiva/sclera: Conjunctivae normal.   Cardiovascular:      Rate and Rhythm: Normal rate and regular rhythm.      Heart sounds: No murmur heard.     No gallop.   Pulmonary:      Effort: No respiratory distress.      Breath sounds: Normal breath sounds. No wheezing, rhonchi or rales.    Abdominal:      General: Bowel sounds are normal. There is no distension.      Palpations: Abdomen is soft. There is no mass.      Comments: Gravid uterus, 37 weeks pregnant   Musculoskeletal:         General: No tenderness.      Cervical back: Normal range of motion. No rigidity.      Right lower leg: No edema.      Left lower leg: No edema.   Lymphadenopathy:      Cervical: No cervical adenopathy.   Skin:     Findings: No lesion or rash.   Neurological:      General: No focal deficit present.      Mental Status: She is alert and oriented to person, place, and time.   Psychiatric:         Mood and Affect: Mood normal.         Judgment: Judgment normal.

## 2024-10-17 ENCOUNTER — APPOINTMENT (OUTPATIENT)
Dept: LAB | Facility: CLINIC | Age: 39
End: 2024-10-17
Payer: MEDICARE

## 2024-10-17 DIAGNOSIS — D68.00 VON WILLEBRAND DISEASE (HCC): ICD-10-CM

## 2024-10-17 LAB
BASOPHILS # BLD AUTO: 0.02 THOUSANDS/ΜL (ref 0–0.1)
BASOPHILS NFR BLD AUTO: 0 % (ref 0–1)
EOSINOPHIL # BLD AUTO: 0.23 THOUSAND/ΜL (ref 0–0.61)
EOSINOPHIL NFR BLD AUTO: 2 % (ref 0–6)
ERYTHROCYTE [DISTWIDTH] IN BLOOD BY AUTOMATED COUNT: 13.2 % (ref 11.6–15.1)
FERRITIN SERPL-MCNC: 11 NG/ML (ref 11–307)
HCT VFR BLD AUTO: 37.8 % (ref 34.8–46.1)
HGB BLD-MCNC: 11.9 G/DL (ref 11.5–15.4)
IMM GRANULOCYTES # BLD AUTO: 0.07 THOUSAND/UL (ref 0–0.2)
IMM GRANULOCYTES NFR BLD AUTO: 1 % (ref 0–2)
IRON SATN MFR SERPL: 15 % (ref 15–50)
IRON SERPL-MCNC: 64 UG/DL (ref 50–212)
LYMPHOCYTES # BLD AUTO: 1.46 THOUSANDS/ΜL (ref 0.6–4.47)
LYMPHOCYTES NFR BLD AUTO: 15 % (ref 14–44)
MCH RBC QN AUTO: 28.8 PG (ref 26.8–34.3)
MCHC RBC AUTO-ENTMCNC: 31.5 G/DL (ref 31.4–37.4)
MCV RBC AUTO: 92 FL (ref 82–98)
MONOCYTES # BLD AUTO: 0.46 THOUSAND/ΜL (ref 0.17–1.22)
MONOCYTES NFR BLD AUTO: 5 % (ref 4–12)
NEUTROPHILS # BLD AUTO: 7.28 THOUSANDS/ΜL (ref 1.85–7.62)
NEUTS SEG NFR BLD AUTO: 77 % (ref 43–75)
NRBC BLD AUTO-RTO: 0 /100 WBCS
PLATELET # BLD AUTO: 134 THOUSANDS/UL (ref 149–390)
PMV BLD AUTO: 12.2 FL (ref 8.9–12.7)
RBC # BLD AUTO: 4.13 MILLION/UL (ref 3.81–5.12)
TIBC SERPL-MCNC: 434 UG/DL (ref 250–450)
UIBC SERPL-MCNC: 370 UG/DL (ref 155–355)
WBC # BLD AUTO: 9.52 THOUSAND/UL (ref 4.31–10.16)

## 2024-10-17 PROCEDURE — 85245 CLOT FACTOR VIII VW RISTOCTN: CPT

## 2024-10-17 PROCEDURE — 85246 CLOT FACTOR VIII VW ANTIGEN: CPT

## 2024-10-17 PROCEDURE — 83540 ASSAY OF IRON: CPT

## 2024-10-17 PROCEDURE — 85025 COMPLETE CBC W/AUTO DIFF WBC: CPT

## 2024-10-17 PROCEDURE — 85240 CLOT FACTOR VIII AHG 1 STAGE: CPT

## 2024-10-17 PROCEDURE — 82728 ASSAY OF FERRITIN: CPT

## 2024-10-17 PROCEDURE — 83550 IRON BINDING TEST: CPT

## 2024-10-17 PROCEDURE — 36415 COLL VENOUS BLD VENIPUNCTURE: CPT

## 2024-10-18 ENCOUNTER — ROUTINE PRENATAL (OUTPATIENT)
Dept: OBGYN CLINIC | Facility: CLINIC | Age: 39
End: 2024-10-18

## 2024-10-18 VITALS
BODY MASS INDEX: 27.06 KG/M2 | WEIGHT: 162.4 LBS | SYSTOLIC BLOOD PRESSURE: 104 MMHG | HEIGHT: 65 IN | RESPIRATION RATE: 18 BRPM | DIASTOLIC BLOOD PRESSURE: 65 MMHG | HEART RATE: 75 BPM

## 2024-10-18 DIAGNOSIS — Z3A.38 38 WEEKS GESTATION OF PREGNANCY: Primary | ICD-10-CM

## 2024-10-18 PROCEDURE — 99213 OFFICE O/P EST LOW 20 MIN: CPT | Performed by: OBSTETRICS & GYNECOLOGY

## 2024-10-18 NOTE — PROGRESS NOTES
María Prasad presents today for routine OB visit at 38w0d.  Blood Pressure: 104/65  Wt=73.7 kg (162 lb 6.4 oz); Body mass index is 27.02 kg/m².; TWG=Not found.  Fetal Heart Rate: 140; Fundal Height (cm): 37 cm  Abdomen: gravid, soft, non-tender.  She reports no concerns.  Denies uterine contractions.  Denies vaginal bleeding or leaking of fluid.  Reports adequate fetal movement of at least 10 movements in 2 hours once daily.    Reviewed premature labor precautions and fetal kick counts.  Advised to continue medications and return in 1 weeks.    Current Outpatient Medications   Medication Instructions    buprenorphine (SUBUTEX) 8 mg Daily    buprenorphine-naloxone (SUBOXONE) 8-2 mg per SL tablet DISSOLVE 1 TABLET UNDER THE TONGUE TWICE DAILY    docusate sodium (COLACE) 100 mg, 2 times daily    ferrous sulfate 325 mg, Daily with breakfast    naloxone (NARCAN) 4 mg/0.1 mL nasal spray USE ONE SPRAY INTRANASALLY AS DIRECTED    Prenatal Vit-Fe Fumarate-FA (PRENATAL PO) Oral    Tranexamic Acid 1,300 mg, Oral, 3 times daily       G6 Problems (from 24 to present)       Problem Noted Resolved    38 weeks gestation of pregnancy 2024 by Berta aPyne,  No    Overview Addendum 10/18/2024 11:14 AM by Leann De La Cruz MD     Overview:   Labs  Pap smear Not on file- pap postpartum   and GC/CT 24 negative  Prenatal panel  MSAFP [  too late ]  Genetic screening [  ]  28w labs [  ]  1 hr GTT- 162, 3hr   GBS negative 10/11/24  Vaccines:  Flu vaccine: declined   Covid vaccine: none  Tdap vaccine: allergy  RSV vaccine: offer [id number] given to patient, declined   RhoGAM [ not indicated ]  Contraception: tubal MA 31 signed 2024, pills to bridge  Feeding plan: bottle feed  Birth plan:  with epidural  Delivery consent: signed 24  Ultrasounds: 24, breech, EFW 39%, growth scan 24 demonstrated vertex presentation with normal fetal growth          Von Willebrand disease (HCC)  2024 by Lucie Mariee MD No    Overview Addendum 10/15/2024  1:01 PM by Jannette Byrd MD     VWF antigen and factor Vlll levels normal    Heme recs: 7 days prophylactic TXA postpartum  Fetal recs: avoidance of fetal scalp electrode and operative vaginal delivery. Newborns that have a risk of only mild von Willebrand's can receive normal  care but elective procedures such as circumcision should be deferred until the von Willebrand's factor and factor 8 activity levels have been confirmed.                   Leann De La Cruz MD    PGY-3

## 2024-10-19 LAB
FACT XIIIA PPP-ACNC: 140 % (ref 56–140)
VWF AG ACT/NOR PPP IA: 218 % (ref 50–200)
VWF:RCO ACT/NOR PPP PL AGG: 127 % (ref 50–200)

## 2024-10-25 ENCOUNTER — ROUTINE PRENATAL (OUTPATIENT)
Dept: OBGYN CLINIC | Facility: CLINIC | Age: 39
End: 2024-10-25

## 2024-10-25 VITALS
HEIGHT: 65 IN | HEART RATE: 94 BPM | SYSTOLIC BLOOD PRESSURE: 111 MMHG | DIASTOLIC BLOOD PRESSURE: 76 MMHG | WEIGHT: 162.4 LBS | BODY MASS INDEX: 27.06 KG/M2 | RESPIRATION RATE: 18 BRPM

## 2024-10-25 DIAGNOSIS — Z3A.39 39 WEEKS GESTATION OF PREGNANCY: Primary | ICD-10-CM

## 2024-10-25 NOTE — PROGRESS NOTES
[facility]   PRENATAL VISIT  María Prasad  [id number]  1985        ASSESSMENT/PLAN:  Problem List       Request for sterilization    Overview     MA 31 signed 24         Von Willebrand disease (HCC)    Overview     VWF antigen and factor Vlll levels normal    Heme recs: 7 days prophylactic TXA postpartum  Fetal recs: avoidance of fetal scalp electrode and operative vaginal delivery. Newborns that have a risk of only mild von Willebrand's can receive normal  care but elective procedures such as circumcision should be deferred until the von Willebrand's factor and factor 8 activity levels have been confirmed.         39 weeks gestation of pregnancy    Overview     Overview:   Labs  Pap smear Not on file- pap postpartum   and GC/CT 24 negative  Prenatal panel  MSAFP [  too late ]  Genetic screening [  ]  28w labs [  ]  1 hr GTT- 162, 3hr   GBS negative 10/11/24  Vaccines:  Flu vaccine: declined   Covid vaccine: none  Tdap vaccine: allergy  RSV vaccine: offer [id number] given to patient, declined   RhoGAM [ not indicated ]  Contraception: tubal SHERLEY [place] signed 2024, pills to bridge  Feeding plan: bottle feed  Birth plan:  with epidural  Delivery consent: signed 24  Induction consent signed 10/25/24  Ultrasounds: 24, breech, EFW 39%, growth scan 24 demonstrated vertex presentation with normal fetal growth          Late prenatal care    Lack of immunity to hepatitis B virus demonstrated by serologic test    Suboxone maintenance treatment complicating pregnancy, antepartum (HCC)           Subjective: 39 y.o.  39w0d here for prenatal visit. She denies contractions. She denies leakage of fluid and vaginal bleeding. She endorses good fetal movement. No concerns today.    Objective:  Gen: no acute distress, nonlabored breathing  Pre-Greg Vitals      Flowsheet Row Most Recent Value   Prenatal Assessment    Fetal Heart Rate 151   Fundal Height  (cm) 38 cm   Presentation Vertex   Prenatal Vitals    Blood Pressure 111/76   Weight - Scale 73.7 kg (162 lb 6.4 oz)   Urine Albumin/Glucose    Dilation/Effacement/Station    Cervical Dilation 4   Cervical Effacement 70   Fetal Station -3   Vaginal Drainage    Edema              Pregnancy Plan:  No overview and plan has been documented for this pregnancy.      General: Well appearing, no distress  Respiratory: Unlabored breathing  Cardiovascular: Regular rate.  Abdomen: Soft, gravid, nontender  Fundal Height: Appropriate for gestational age.  Extremities: Warm and well perfused.  Non tender.        D/w  [employer]      Benton Herr MD  Family Medicine Resident PGY-3

## 2024-10-31 ENCOUNTER — HOSPITAL ENCOUNTER (INPATIENT)
Facility: HOSPITAL | Age: 39
LOS: 3 days | Discharge: HOME | DRG: 560 | End: 2024-11-03
Attending: OBSTETRICS & GYNECOLOGY | Admitting: OBSTETRICS & GYNECOLOGY
Payer: MEDICARE

## 2024-10-31 ENCOUNTER — ANESTHESIA (INPATIENT)
Dept: ANESTHESIOLOGY | Facility: HOSPITAL | Age: 39
DRG: 560 | End: 2024-10-31
Payer: MEDICARE

## 2024-10-31 ENCOUNTER — HOSPITAL ENCOUNTER (OUTPATIENT)
Dept: LABOR AND DELIVERY | Facility: HOSPITAL | Age: 39
Discharge: HOME | DRG: 560 | End: 2024-10-31
Payer: MEDICARE

## 2024-10-31 ENCOUNTER — ANESTHESIA EVENT (INPATIENT)
Dept: ANESTHESIOLOGY | Facility: HOSPITAL | Age: 39
DRG: 560 | End: 2024-10-31
Payer: MEDICARE

## 2024-10-31 DIAGNOSIS — Z3A.39 39 WEEKS GESTATION OF PREGNANCY: Primary | ICD-10-CM

## 2024-10-31 DIAGNOSIS — Z30.9 CONTRACEPTION MANAGEMENT: ICD-10-CM

## 2024-10-31 PROBLEM — Z34.90 ENCOUNTER FOR INDUCTION OF LABOR: Status: ACTIVE | Noted: 2024-10-31

## 2024-10-31 LAB
ABO GROUP BLD: NORMAL
BLD GP AB SCN SERPL QL: NEGATIVE
ERYTHROCYTE [DISTWIDTH] IN BLOOD BY AUTOMATED COUNT: 13.7 % (ref 11.6–15.1)
HCT VFR BLD AUTO: 37.8 % (ref 34.8–46.1)
HGB BLD-MCNC: 12.4 G/DL (ref 11.5–15.4)
HOLD SPECIMEN: NORMAL
MCH RBC QN AUTO: 29.6 PG (ref 26.8–34.3)
MCHC RBC AUTO-ENTMCNC: 32.8 G/DL (ref 31.4–37.4)
MCV RBC AUTO: 90 FL (ref 82–98)
PLATELET # BLD AUTO: 135 THOUSANDS/UL (ref 149–390)
PMV BLD AUTO: 12 FL (ref 8.9–12.7)
RBC # BLD AUTO: 4.19 MILLION/UL (ref 3.81–5.12)
RH BLD: POSITIVE
SPECIMEN EXPIRATION DATE: NORMAL
WBC # BLD AUTO: 9.37 THOUSAND/UL (ref 4.31–10.16)

## 2024-10-31 PROCEDURE — NC001 PR NO CHARGE: Performed by: OBSTETRICS & GYNECOLOGY

## 2024-10-31 PROCEDURE — 86780 TREPONEMA PALLIDUM: CPT

## 2024-10-31 PROCEDURE — 86900 BLOOD TYPING SEROLOGIC ABO: CPT

## 2024-10-31 PROCEDURE — 86901 BLOOD TYPING SEROLOGIC RH(D): CPT

## 2024-10-31 PROCEDURE — 85027 COMPLETE CBC AUTOMATED: CPT

## 2024-10-31 PROCEDURE — 86923 COMPATIBILITY TEST ELECTRIC: CPT

## 2024-10-31 PROCEDURE — 86850 RBC ANTIBODY SCREEN: CPT

## 2024-10-31 RX ORDER — ONDANSETRON 2 MG/ML
4 INJECTION INTRAMUSCULAR; INTRAVENOUS EVERY 6 HOURS PRN
Status: DISCONTINUED | OUTPATIENT
Start: 2024-10-31 | End: 2024-11-03 | Stop reason: HOSPADM

## 2024-10-31 RX ORDER — SODIUM CHLORIDE, SODIUM LACTATE, POTASSIUM CHLORIDE, CALCIUM CHLORIDE 600; 310; 30; 20 MG/100ML; MG/100ML; MG/100ML; MG/100ML
125 INJECTION, SOLUTION INTRAVENOUS CONTINUOUS
Status: DISCONTINUED | OUTPATIENT
Start: 2024-10-31 | End: 2024-11-03 | Stop reason: HOSPADM

## 2024-10-31 RX ORDER — OXYTOCIN/RINGER'S LACTATE 30/500 ML
1-30 PLASTIC BAG, INJECTION (ML) INTRAVENOUS
Status: DISCONTINUED | OUTPATIENT
Start: 2024-10-31 | End: 2024-11-01

## 2024-10-31 RX ORDER — BUPRENORPHINE AND NALOXONE 8; 2 MG/1; MG/1
8 FILM, SOLUBLE BUCCAL; SUBLINGUAL 2 TIMES DAILY
Status: DISCONTINUED | OUTPATIENT
Start: 2024-10-31 | End: 2024-11-03 | Stop reason: HOSPADM

## 2024-10-31 RX ORDER — BUPIVACAINE HYDROCHLORIDE 2.5 MG/ML
30 INJECTION, SOLUTION EPIDURAL; INFILTRATION; INTRACAUDAL ONCE AS NEEDED
Status: DISCONTINUED | OUTPATIENT
Start: 2024-10-31 | End: 2024-11-03 | Stop reason: HOSPADM

## 2024-10-31 RX ORDER — BUPRENORPHINE 8 MG/1
TABLET SUBLINGUAL DAILY
Status: CANCELLED | OUTPATIENT
Start: 2024-11-01

## 2024-10-31 RX ADMIN — SODIUM CHLORIDE, SODIUM LACTATE, POTASSIUM CHLORIDE, AND CALCIUM CHLORIDE 125 ML/HR: .6; .31; .03; .02 INJECTION, SOLUTION INTRAVENOUS at 20:26

## 2024-10-31 RX ADMIN — SODIUM CHLORIDE, SODIUM LACTATE, POTASSIUM CHLORIDE, AND CALCIUM CHLORIDE 125 ML/HR: .6; .31; .03; .02 INJECTION, SOLUTION INTRAVENOUS at 17:30

## 2024-10-31 RX ADMIN — BUPRENORPHINE AND NALOXONE 8 MG: 8; 2 FILM BUCCAL; SUBLINGUAL at 20:50

## 2024-10-31 RX ADMIN — OXYTOCIN 2 MILLI-UNITS/MIN: 10 INJECTION INTRAVENOUS at 19:14

## 2024-10-31 NOTE — PLAN OF CARE
Problem: ANTEPARTUM  Goal: Maintain pregnancy as long as maternal and/or fetal condition is stable  Description: INTERVENTIONS:  - Maternal surveillance  - Fetal surveillance  - Monitor uterine activity  - Medications as ordered  - Bedrest  Outcome: Progressing     Problem: BIRTH - VAGINAL/ SECTION  Goal: Fetal and maternal status remain reassuring during the birth process  Description: INTERVENTIONS:  - Monitor vital signs  - Monitor fetal heart rate  - Monitor uterine activity  - Monitor labor progression (vaginal delivery)  - DVT prophylaxis  - Antibiotic prophylaxis  Outcome: Progressing  Goal: Emotionally satisfying birthing experience for mother/fetus  Description: Interventions:  - Assess, plan, implement and evaluate the nursing care given to the patient in labor  - Advocate the philosophy that each childbirth experience is a unique experience and support the family's chosen level of involvement and control during the labor process   - Actively participate in both the patient's and family's teaching of the birth process  - Consider cultural, Adventism and age-specific factors and plan care for the patient in labor  Outcome: Progressing     Problem: POSTPARTUM  Goal: Experiences normal postpartum course  Description: INTERVENTIONS:  - Monitor maternal vital signs  - Assess uterine involution and lochia  Outcome: Progressing  Goal: Appropriate maternal -  bonding  Description: INTERVENTIONS:  - Identify family support  - Assess for appropriate maternal/infant bonding   -Encourage maternal/infant bonding opportunities  - Referral to  or  as needed  Outcome: Progressing  Goal: Establishment of infant feeding pattern  Description: INTERVENTIONS:  - Assess breast/bottle feeding  - Refer to lactation as needed  Outcome: Progressing     Problem: PAIN - ADULT  Goal: Verbalizes/displays adequate comfort level or baseline comfort level  Description: Interventions:  - Encourage  patient to monitor pain and request assistance  - Assess pain using appropriate pain scale  - Administer analgesics based on type and severity of pain and evaluate response  - Implement non-pharmacological measures as appropriate and evaluate response  - Consider cultural and social influences on pain and pain management  - Notify physician/advanced practitioner if interventions unsuccessful or patient reports new pain  Outcome: Progressing     Problem: INFECTION - ADULT  Goal: Absence or prevention of progression during hospitalization  Description: INTERVENTIONS:  - Assess and monitor for signs and symptoms of infection  - Monitor lab/diagnostic results  - Monitor all insertion sites, i.e. indwelling lines, tubes, and drains  - Monitor endotracheal if appropriate and nasal secretions for changes in amount and color  - [place] appropriate cooling/warming therapies per order  - Administer medications as ordered  - Instruct and encourage patient and family to use good hand hygiene technique  - Identify and instruct in appropriate isolation precautions for identified infection/condition  Outcome: Progressing  Goal: Absence of fever/infection during neutropenic period  Description: INTERVENTIONS:  - Monitor WBC    Outcome: Progressing     Problem: SAFETY ADULT  Goal: Patient will remain free of falls  Description: INTERVENTIONS:  - Educate patient/family on patient safety including physical limitations  - Instruct patient to call for assistance with activity   - Consult OT/PT to assist with strengthening/mobility   - Keep Call bell within reach  - Keep bed low and locked with side rails adjusted as appropriate  - Keep care items and personal belongings within reach  - Initiate and maintain comfort rounds  - Make Fall Risk Sign visible to staff  - Offer Toileting every 2 Hours, in advance of need     - Apply yellow socks and bracelet for high fall risk patients  - Consider moving patient to room near nurses  station  Outcome: Progressing  Goal: Maintain or return to baseline ADL function  Description: INTERVENTIONS:  -  Assess patient's ability to carry out ADLs; assess patient's baseline for ADL function and identify physical deficits which impact ability to perform ADLs (bathing, care of mouth/teeth, toileting, grooming, dressing, etc.)  - Assess/evaluate cause of self-care deficits   - Assess range of motion  - Assess patient's mobility; develop plan if impaired  - Assess patient's need for assistive devices and provide as appropriate  - Encourage maximum independence but intervene and supervise when necessary  - Involve family in performance of ADLs  - Assess for home care needs following discharge   - Consider OT consult to assist with ADL evaluation and planning for discharge  - Provide patient education as appropriate  Outcome: Progressing  Goal: Maintains/Returns to pre admission functional level  Description: INTERVENTIONS:  - Perform AM-PAC 6 Click Basic Mobility/ Daily Activity assessment daily.  - Set and communicate daily mobility goal to care team and patient/family/caregiver.   - Collaborate with rehabilitation services on mobility goals if consulted  - Perform Range of Motion 3 times a day.  - Reposition patient every 2 hours.  - Dangle patient 3 times a day  - Stand patient 3 times a day  - Ambulate patient 3 times a day  - Out of bed to chair 3 times a day   - Out of bed for meals 3 times a day  - Out of bed for toileting  - Record patient progress and toleration of activity level   Outcome: Progressing     Problem: Knowledge Deficit  Goal: Patient/family/caregiver demonstrates understanding of disease process, treatment plan, medications, and discharge instructions  Description: Complete learning assessment and assess knowledge base.  Interventions:  - Provide teaching at level of understanding  - Provide teaching via preferred learning methods  Outcome: Progressing     Problem: DISCHARGE  PLANNING  Goal: Discharge to home or other facility with appropriate resources  Description: INTERVENTIONS:  - Identify barriers to discharge w/patient and caregiver  - Arrange for needed discharge resources and transportation as appropriate  - Identify discharge learning needs (meds, wound care, etc.)  - Arrange for interpretive services to assist at discharge as needed  - Refer to Case Management Department for coordinating discharge planning if the patient needs post-hospital services based on physician/advanced practitioner order or complex needs related to functional status, cognitive ability, or social support system  Outcome: Progressing

## 2024-10-31 NOTE — H&P
H&P Exam - Obstetrics   María Prasad 39 y.o. female MRN: [id number]  Unit/Bed#:  204-01 Encounter: [id number]      ASSESSMENT:  40yo  at 39w6d weeks gestation who is being admitted for elective induction of labor.  EFW: 68% at 35w3d  VTX by transabdominal ultrasound     PLAN:  Encounter for induction of labor  Assessment & Plan  Admit to L&D  CBC, RPR, Type & Screen  Clear liquid diet  GBS status: negative   Postpartum contraception plan: POP bridge to bilateral salpingectomy  Analgesia at maternal request  Start with pitocin      Suboxone maintenance treatment complicating pregnancy, antepartum (HCC)  Assessment & Plan  Home regimen ordered: Suboxone 8-2 mg BID  Verified through PDMP    Lack of immunity to hepatitis B virus demonstrated by serologic test  Assessment & Plan  Offer vaccine postpartum    Von Willebrand disease (HCC)  Assessment & Plan  VWF antigen and factor Vlll levels normal     Heme recs: 7 days prophylactic TXA postpartum  Fetal recs: avoidance of fetal scalp electrode and operative vaginal delivery. Newborns that have a risk of only mild von Willebrand's can receive normal  care but elective procedures such as circumcision should be deferred until the von Willebrand's factor and factor 8 activity levels have been confirmed.    Request for sterilization  Assessment & Plan  MA 31 signed 2024  Plan POP bridge        Discussed with Dr. Kraus      This patient will be an INPATIENT  and I certify the anticipated length of stay is >2 Midnights.      History of Present Illness     Chief Complaint: Induction of labor    HPI:  María Prasad is a 39 y.o.  female with an TIM of 2024, by Ultrasound at 39w6d weeks gestation who is being admitted for elective induction of labor.    Contractions: occasional cramping  Loss of fluid: denies  Vaginal bleeding: denies  Fetal movement: present        PREGNANCY COMPLICATIONS:   1) Hep B nonimmune  2) Suboxone therapy  3) Von  Willebrand disease     OB History    Para Term  AB Living   6 5 5   0 5   SAB IAB Ectopic Multiple Live Births     0     5      # Outcome Date GA Lbr Lamine/2nd Weight Sex Type Anes PTL Lv   6 Current            5 Term 23    M Vag-Spont   NICHOLE   4 Term /  / 00:13 3130 g (6 lb 14.4 oz) M Vag-Spont EPI  NICHOLE   3 Term 10/29/16 41w0d 06:03 / 00:11 4245 g (9 lb 5.7 oz) F Vag-Spont Local N NICHOLE   2 Term 12/10/13 40w4d  4593 g (10 lb 2 oz) M Vag-Spont EPI N NICHOLE      Birth Comments: Dr Boston Followed Von Willebrands   1 Term 05 39w0d  3062 g (6 lb 12 oz) M Vag-Spont EPI N NICHOLE      Birth Comments: IOL d/t bleeding. Suspected abruption.       Complications: Abruptio Placenta       Baby complications/comments: none known     Review of Systems   Constitutional:  Negative for chills and fever.   Respiratory: Negative.     Cardiovascular: Negative.    Gastrointestinal:  Positive for abdominal pain (cramping).   Genitourinary:  Negative for vaginal bleeding.   Neurological:  Negative for headaches.   Psychiatric/Behavioral: Negative.           Historical Information   Past Medical History:   Diagnosis Date    Von Willebrand disease (HCC)      Past Surgical History:   Procedure Laterality Date    CERVICAL BIOPSY  W/ LOOP ELECTRODE EXCISION      DILATION AND CURETTAGE OF UTERUS N/A 2021    Procedure: DILATATION AND CURETTAGE (D&C) FOR RETAINED POC;  Surgeon: Tanya Paige MD;  Location: [facility];  Service: Gynecology    INDUCED       WISDOM TOOTH EXTRACTION       Social History   Social History     Substance and Sexual Activity   Alcohol Use Not Currently    Comment: social     Social History     Substance and Sexual Activity   Drug Use Not Currently    Types: Cocaine, Amphetamines    Comment: Per chart and opoid use - per pt quit      Social History     Tobacco Use   Smoking Status Former    Types: Cigarettes   Smokeless Tobacco Never     Family History: Family history  "non-contributory    Meds/Allergies      Medications Prior to Admission:     buprenorphine-naloxone (SUBOXONE) 8-2 mg per SL tablet    Prenatal Vit-Fe Fumarate-FA (PRENATAL PO)    buprenorphine (SUBUTEX) 8 mg    docusate sodium (COLACE) 100 mg capsule    ferrous sulfate 325 (65 Fe) mg tablet    naloxone (NARCAN) 4 mg/0.1 mL nasal spray     Allergies   Allergen Reactions    Penicillins Hives    Bee Venom Throat Swelling    Beeswax Throat Swelling    Penicillin V Hives    Pertussis Vaccine Hives and Other (See Comments)    Pertussis Vaccines Hives    Pineapple - Food Allergy Swelling    Pollen Extract Itching     Itching, horse voice, headaches       OBJECTIVE:    Vitals: Blood pressure 107/71, pulse 91, temperature 98 °F (36.7 °C), resp. rate 16, height 5' 5\" (1.651 m), weight 73.5 kg (162 lb), last menstrual period 03/15/2024, SpO2 100%.Body mass index is 26.96 kg/m².    Physical Exam  Vitals reviewed. Exam conducted with a chaperone present.   Constitutional:       General: She is not in acute distress.     Appearance: Normal appearance.   HENT:      Head: Normocephalic and atraumatic.   Eyes:      Extraocular Movements: Extraocular movements intact.   Cardiovascular:      Rate and Rhythm: Normal rate.   Pulmonary:      Effort: Pulmonary effort is normal. No respiratory distress.   Abdominal:      Comments: Gravid     Genitourinary:     General: Normal vulva.   Skin:     General: Skin is warm and dry.   Neurological:      Mental Status: She is alert and oriented to person, place, and time.   Psychiatric:         Mood and Affect: Mood normal.         Behavior: Behavior normal.             Cervix:   4/70/-3    Fetal heart rate:   Baseline Rate (FHR): 115 bpm  Variability: Moderate  Accelerations: 15 x 15 or greater  Decelerations: None  FHR Category: Category I    [place]:   Contraction Frequency (minutes): occasional  Contraction Duration (seconds): 60  Contraction Intensity: Mild    GBS: negative    Prenatal Labs: "   Blood Type:   Lab Results   Component Value Date/Time    ABO Grouping O 10/31/2024 06:03 PM      D (Rh type):   Lab Results   Component Value Date/Time    Rh Factor Positive 10/31/2024 06:03 PM       Antibody Screen: negative    HCT/HGB:   Lab Results   Component Value Date/Time    Hematocrit 37.8 10/31/2024 06:03 PM    Hemoglobin 12.4 10/31/2024 06:03 PM       Platelets:   Lab Results   Component Value Date/Time    Platelets 135 (L) 10/31/2024 06:03 PM       1 hour Glucola:   Lab Results   Component Value Date/Time    Glucose 162 (H) 09/17/2024 12:06 PM     3 hour GTT: wnl    Rubella:   Lab Results   Component Value Date/Time    Rubella IgG Quant 16.8 08/05/2024 11:28 AM       RPR: nonreactive    Hep B:   Lab Results   Component Value Date/Time    Hepatitis B Surface Ag Non-reactive 08/05/2024 11:28 AM      Hep C ab: nonreactive    HIV: nonreactive    Chlamydia:  negative    Gonorrhea:   Lab Results   Component Value Date/Time    N gonorrhoeae, DNA Probe Negative 08/28/2024 02:25 PM         Invasive Devices       Peripheral Intravenous Line  Duration             Peripheral IV 10/31/24 Dorsal (posterior);Right Wrist <1 day                      Jannette Zavala MD  OBGYN, PGY-I  10/31/24

## 2024-10-31 NOTE — ASSESSMENT & PLAN NOTE
Admit to L&D  CBC, RPR, Type & Screen  Clear liquid diet  GBS status: negative   Postpartum contraception plan: POP bridge to bilateral salpingectomy  Analgesia at maternal request  Start with pitocin

## 2024-10-31 NOTE — PLAN OF CARE
Problem: ANTEPARTUM  Goal: Maintain pregnancy as long as maternal and/or fetal condition is stable  Description: INTERVENTIONS:  - Maternal surveillance  - Fetal surveillance  - Monitor uterine activity  - Medications as ordered  - Bedrest  Outcome: Progressing     Problem: BIRTH - VAGINAL/ SECTION  Goal: Fetal and maternal status remain reassuring during the birth process  Description: INTERVENTIONS:  - Monitor vital signs  - Monitor fetal heart rate  - Monitor uterine activity  - Monitor labor progression (vaginal delivery)  - DVT prophylaxis  - Antibiotic prophylaxis  Outcome: Progressing  Goal: Emotionally satisfying birthing experience for mother/fetus  Description: Interventions:  - Assess, plan, implement and evaluate the nursing care given to the patient in labor  - Advocate the philosophy that each childbirth experience is a unique experience and support the family's chosen level of involvement and control during the labor process   - Actively participate in both the patient's and family's teaching of the birth process  - Consider cultural, Moravian and age-specific factors and plan care for the patient in labor  Outcome: Progressing     Problem: POSTPARTUM  Goal: Experiences normal postpartum course  Description: INTERVENTIONS:  - Monitor maternal vital signs  - Assess uterine involution and lochia  Outcome: Progressing  Goal: Appropriate maternal -  bonding  Description: INTERVENTIONS:  - Identify family support  - Assess for appropriate maternal/infant bonding   -Encourage maternal/infant bonding opportunities  - Referral to  or  as needed  Outcome: Progressing  Goal: Establishment of infant feeding pattern  Description: INTERVENTIONS:  - Assess breast/bottle feeding  - Refer to lactation as needed  Outcome: Progressing     Problem: PAIN - ADULT  Goal: Verbalizes/displays adequate comfort level or baseline comfort level  Description: Interventions:  - Encourage  patient to monitor pain and request assistance  - Assess pain using appropriate pain scale  - Administer analgesics based on type and severity of pain and evaluate response  - Implement non-pharmacological measures as appropriate and evaluate response  - Consider cultural and social influences on pain and pain management  - Notify physician/advanced practitioner if interventions unsuccessful or patient reports new pain  Outcome: Progressing     Problem: INFECTION - ADULT  Goal: Absence or prevention of progression during hospitalization  Description: INTERVENTIONS:  - Assess and monitor for signs and symptoms of infection  - Monitor lab/diagnostic results  - Monitor all insertion sites, i.e. indwelling lines, tubes, and drains  - Monitor endotracheal if appropriate and nasal secretions for changes in amount and color  - [place] appropriate cooling/warming therapies per order  - Administer medications as ordered  - Instruct and encourage patient and family to use good hand hygiene technique  - Identify and instruct in appropriate isolation precautions for identified infection/condition  Outcome: Progressing  Goal: Absence of fever/infection during neutropenic period  Description: INTERVENTIONS:  - Monitor WBC    Outcome: Progressing     Problem: SAFETY ADULT  Goal: Patient will remain free of falls  Description: INTERVENTIONS:  - Educate patient/family on patient safety including physical limitations  - Instruct patient to call for assistance with activity   - Consult OT/PT to assist with strengthening/mobility   - Keep Call bell within reach  - Keep bed low and locked with side rails adjusted as appropriate  - Keep care items and personal belongings within reach  - Initiate and maintain comfort rounds  - Make Fall Risk Sign visible to staff  - Offer Toileting every 2 Hours, in advance of need     - Apply yellow socks and bracelet for high fall risk patients  - Consider moving patient to room near nurses  station  Outcome: Progressing  Goal: Maintain or return to baseline ADL function  Description: INTERVENTIONS:  -  Assess patient's ability to carry out ADLs; assess patient's baseline for ADL function and identify physical deficits which impact ability to perform ADLs (bathing, care of mouth/teeth, toileting, grooming, dressing, etc.)  - Assess/evaluate cause of self-care deficits   - Assess range of motion  - Assess patient's mobility; develop plan if impaired  - Assess patient's need for assistive devices and provide as appropriate  - Encourage maximum independence but intervene and supervise when necessary  - Involve family in performance of ADLs  - Assess for home care needs following discharge   - Consider OT consult to assist with ADL evaluation and planning for discharge  - Provide patient education as appropriate  Outcome: Progressing  Goal: Maintains/Returns to pre admission functional level  Description: INTERVENTIONS:  - Perform AM-PAC 6 Click Basic Mobility/ Daily Activity assessment daily.  - Set and communicate daily mobility goal to care team and patient/family/caregiver.   - Collaborate with rehabilitation services on mobility goals if consulted  - Perform Range of Motion 3 times a day.  - Reposition patient every 2 hours.  - Dangle patient 3 times a day  - Stand patient 3 times a day  - Ambulate patient 3 times a day  - Out of bed to chair 3 times a day   - Out of bed for meals 3 times a day  - Out of bed for toileting  - Record patient progress and toleration of activity level   Outcome: Progressing     Problem: Knowledge Deficit  Goal: Patient/family/caregiver demonstrates understanding of disease process, treatment plan, medications, and discharge instructions  Description: Complete learning assessment and assess knowledge base.  Interventions:  - Provide teaching at level of understanding  - Provide teaching via preferred learning methods  Outcome: Progressing     Problem: DISCHARGE  PLANNING  Goal: Discharge to home or other facility with appropriate resources  Description: INTERVENTIONS:  - Identify barriers to discharge w/patient and caregiver  - Arrange for needed discharge resources and transportation as appropriate  - Identify discharge learning needs (meds, wound care, etc.)  - Arrange for interpretive services to assist at discharge as needed  - Refer to Case Management Department for coordinating discharge planning if the patient needs post-hospital services based on physician/advanced practitioner order or complex needs related to functional status, cognitive ability, or social support system  Outcome: Progressing

## 2024-10-31 NOTE — ASSESSMENT & PLAN NOTE
VWF antigen and factor Vlll levels normal     Heme recs: 7 days prophylactic TXA postpartum  Fetal recs: avoidance of fetal scalp electrode and operative vaginal delivery. Newborns that have a risk of only mild von Willebrand's can receive normal  care but elective procedures such as circumcision should be deferred until the von Willebrand's factor and factor 8 activity levels have been confirmed.

## 2024-11-01 LAB
AMPHETAMINES SERPL QL SCN: NEGATIVE
BARBITURATES UR QL: NEGATIVE
BASE EXCESS BLDCOA CALC-SCNC: -4.9 MMOL/L (ref 3–11)
BASE EXCESS BLDCOV CALC-SCNC: -6.1 MMOL/L (ref 1–9)
BENZODIAZ UR QL: NEGATIVE
COCAINE UR QL: NEGATIVE
FENTANYL UR QL SCN: NEGATIVE
HCO3 BLDCOA-SCNC: 21.3 MMOL/L (ref 17.3–27.3)
HCO3 BLDCOV-SCNC: 18.2 MMOL/L (ref 12.2–28.6)
HYDROCODONE UR QL SCN: NEGATIVE
METHADONE UR QL: NEGATIVE
O2 CT VFR BLDCOA CALC: 13 ML/DL
OPIATES UR QL SCN: NEGATIVE
OXYCODONE+OXYMORPHONE UR QL SCN: NEGATIVE
OXYHGB MFR BLDCOA: 56.5 %
OXYHGB MFR BLDCOV: 75.3 %
PCO2 BLDCOA: 43.2 MM[HG] (ref 30–60)
PCO2 BLDCOV: 32.6 MM HG (ref 27–43)
PCP UR QL: NEGATIVE
PH BLDCOA: 7.31 [PH] (ref 7.23–7.43)
PH BLDCOV: 7.36 [PH] (ref 7.19–7.49)
PO2 BLDCOA: 23.1 MM HG (ref 5–25)
PO2 BLDCOV: 31.6 MM HG (ref 15–45)
SAO2 % BLDCOV: 15.8 ML/DL
THC UR QL: NEGATIVE
TREPONEMA PALLIDUM IGG+IGM AB [PRESENCE] IN SERUM OR PLASMA BY IMMUNOASSAY: NORMAL

## 2024-11-01 PROCEDURE — 10907ZC DRAINAGE OF AMNIOTIC FLUID, THERAPEUTIC FROM PRODUCTS OF CONCEPTION, VIA NATURAL OR ARTIFICIAL OPENING: ICD-10-PCS | Performed by: OBSTETRICS & GYNECOLOGY

## 2024-11-01 PROCEDURE — 59409 OBSTETRICAL CARE: CPT | Performed by: OBSTETRICS & GYNECOLOGY

## 2024-11-01 PROCEDURE — 82805 BLOOD GASES W/O2 SATURATION: CPT | Performed by: OBSTETRICS & GYNECOLOGY

## 2024-11-01 PROCEDURE — 80307 DRUG TEST PRSMV CHEM ANLYZR: CPT

## 2024-11-01 PROCEDURE — 4A1HXCZ MONITORING OF PRODUCTS OF CONCEPTION, CARDIAC RATE, EXTERNAL APPROACH: ICD-10-PCS | Performed by: OBSTETRICS & GYNECOLOGY

## 2024-11-01 PROCEDURE — 3E033VJ INTRODUCTION OF OTHER HORMONE INTO PERIPHERAL VEIN, PERCUTANEOUS APPROACH: ICD-10-PCS | Performed by: OBSTETRICS & GYNECOLOGY

## 2024-11-01 RX ORDER — OXYTOCIN/RINGER'S LACTATE 30/500 ML
62.5 PLASTIC BAG, INJECTION (ML) INTRAVENOUS CONTINUOUS
Status: ACTIVE | OUTPATIENT
Start: 2024-11-01 | End: 2024-11-01

## 2024-11-01 RX ORDER — BENZOCAINE/MENTHOL 6 MG-10 MG
1 LOZENGE MUCOUS MEMBRANE DAILY PRN
Status: DISCONTINUED | OUTPATIENT
Start: 2024-11-01 | End: 2024-11-03 | Stop reason: HOSPADM

## 2024-11-01 RX ORDER — ACETAMINOPHEN 325 MG/1
650 TABLET ORAL EVERY 4 HOURS PRN
Status: DISCONTINUED | OUTPATIENT
Start: 2024-11-01 | End: 2024-11-03 | Stop reason: HOSPADM

## 2024-11-01 RX ORDER — IBUPROFEN 600 MG/1
600 TABLET, FILM COATED ORAL EVERY 6 HOURS
Status: CANCELLED | OUTPATIENT
Start: 2024-11-01

## 2024-11-01 RX ORDER — BUPIVACAINE HYDROCHLORIDE 2.5 MG/ML
INJECTION, SOLUTION EPIDURAL; INFILTRATION; INTRACAUDAL AS NEEDED
Status: DISCONTINUED | OUTPATIENT
Start: 2024-11-01 | End: 2024-11-01 | Stop reason: HOSPADM

## 2024-11-01 RX ORDER — CALCIUM CARBONATE 500 MG/1
1000 TABLET, CHEWABLE ORAL DAILY PRN
Status: DISCONTINUED | OUTPATIENT
Start: 2024-11-01 | End: 2024-11-03 | Stop reason: HOSPADM

## 2024-11-01 RX ORDER — IBUPROFEN 600 MG/1
600 TABLET, FILM COATED ORAL EVERY 6 HOURS
Status: DISCONTINUED | OUTPATIENT
Start: 2024-11-01 | End: 2024-11-03 | Stop reason: HOSPADM

## 2024-11-01 RX ORDER — TRANEXAMIC ACID 650 MG/1
1300 TABLET ORAL 3 TIMES DAILY
Status: DISCONTINUED | OUTPATIENT
Start: 2024-11-01 | End: 2024-11-03 | Stop reason: HOSPADM

## 2024-11-01 RX ORDER — SIMETHICONE 80 MG
80 TABLET,CHEWABLE ORAL 4 TIMES DAILY PRN
Status: CANCELLED | OUTPATIENT
Start: 2024-11-01

## 2024-11-01 RX ORDER — LIDOCAINE HYDROCHLORIDE AND EPINEPHRINE 15; 5 MG/ML; UG/ML
INJECTION, SOLUTION EPIDURAL AS NEEDED
Status: DISCONTINUED | OUTPATIENT
Start: 2024-11-01 | End: 2024-11-01 | Stop reason: HOSPADM

## 2024-11-01 RX ORDER — OXYTOCIN/RINGER'S LACTATE 30/500 ML
250 PLASTIC BAG, INJECTION (ML) INTRAVENOUS ONCE
Status: DISCONTINUED | OUTPATIENT
Start: 2024-11-01 | End: 2024-11-01

## 2024-11-01 RX ORDER — BENZOCAINE/MENTHOL 6 MG-10 MG
1 LOZENGE MUCOUS MEMBRANE DAILY PRN
Status: CANCELLED | OUTPATIENT
Start: 2024-11-01

## 2024-11-01 RX ORDER — CALCIUM CARBONATE 500 MG/1
1000 TABLET, CHEWABLE ORAL DAILY PRN
Status: CANCELLED | OUTPATIENT
Start: 2024-11-01

## 2024-11-01 RX ORDER — TRANEXAMIC ACID 10 MG/ML
1000 INJECTION, SOLUTION INTRAVENOUS ONCE
Status: COMPLETED | OUTPATIENT
Start: 2024-11-01 | End: 2024-11-01

## 2024-11-01 RX ORDER — OXYTOCIN/RINGER'S LACTATE 30/500 ML
250 PLASTIC BAG, INJECTION (ML) INTRAVENOUS ONCE
Status: COMPLETED | OUTPATIENT
Start: 2024-11-01 | End: 2024-11-01

## 2024-11-01 RX ORDER — SIMETHICONE 80 MG
80 TABLET,CHEWABLE ORAL 4 TIMES DAILY PRN
Status: DISCONTINUED | OUTPATIENT
Start: 2024-11-01 | End: 2024-11-03 | Stop reason: HOSPADM

## 2024-11-01 RX ORDER — ONDANSETRON 2 MG/ML
4 INJECTION INTRAMUSCULAR; INTRAVENOUS EVERY 8 HOURS PRN
Status: DISCONTINUED | OUTPATIENT
Start: 2024-11-01 | End: 2024-11-01

## 2024-11-01 RX ORDER — OXYTOCIN/RINGER'S LACTATE 30/500 ML
250 PLASTIC BAG, INJECTION (ML) INTRAVENOUS ONCE
Status: CANCELLED | OUTPATIENT
Start: 2024-11-01 | End: 2024-11-01

## 2024-11-01 RX ORDER — ACETAMINOPHEN 325 MG/1
650 TABLET ORAL EVERY 4 HOURS PRN
Status: CANCELLED | OUTPATIENT
Start: 2024-11-01

## 2024-11-01 RX ORDER — ONDANSETRON 2 MG/ML
4 INJECTION INTRAMUSCULAR; INTRAVENOUS EVERY 8 HOURS PRN
Status: CANCELLED | OUTPATIENT
Start: 2024-11-01

## 2024-11-01 RX ADMIN — BUPRENORPHINE AND NALOXONE 8 MG: 8; 2 FILM BUCCAL; SUBLINGUAL at 09:16

## 2024-11-01 RX ADMIN — TRANEXAMIC ACID 1300 MG: 650 TABLET ORAL at 18:09

## 2024-11-01 RX ADMIN — BUPRENORPHINE AND NALOXONE 8 MG: 8; 2 FILM BUCCAL; SUBLINGUAL at 21:11

## 2024-11-01 RX ADMIN — OXYTOCIN 62.5 MILLI-UNITS/MIN: 10 INJECTION INTRAVENOUS at 07:06

## 2024-11-01 RX ADMIN — BUPIVACAINE HYDROCHLORIDE 10 ML: 2.5 INJECTION, SOLUTION EPIDURAL; INFILTRATION; INTRACAUDAL; PERINEURAL at 05:08

## 2024-11-01 RX ADMIN — TRANEXAMIC ACID 1000 MG: 10 INJECTION, SOLUTION INTRAVENOUS at 06:17

## 2024-11-01 RX ADMIN — ROPIVACAINE HYDROCHLORIDE: 2 INJECTION, SOLUTION EPIDURAL; INFILTRATION at 05:16

## 2024-11-01 RX ADMIN — OXYTOCIN 250 MILLI-UNITS/MIN: 10 INJECTION INTRAVENOUS at 05:34

## 2024-11-01 RX ADMIN — LIDOCAINE HYDROCHLORIDE AND EPINEPHRINE 3 ML: 15; 5 INJECTION, SOLUTION EPIDURAL at 05:06

## 2024-11-01 RX ADMIN — SODIUM CHLORIDE, SODIUM LACTATE, POTASSIUM CHLORIDE, AND CALCIUM CHLORIDE 999 ML/HR: .6; .31; .03; .02 INJECTION, SOLUTION INTRAVENOUS at 04:58

## 2024-11-01 NOTE — OB LABOR/OXYTOCIN SAFETY PROGRESS
Oxytocin Safety Progress Check Note - María Prasad 39 y.o. female MRN: [id number]    Unit/Bed#: -01 Encounter: [id number]    Dose (latricia-units/min) Oxytocin: 16 latricia-units/min  Contraction Frequency (minutes): 2-2.5  Contraction Intensity: Mild/Moderate  Uterine Activity Characteristics: Occasional  Cervical Dilation: 4-5        Cervical Effacement: 70  Fetal Station: -2  Baseline Rate (FHR): 125 bpm  Fetal Heart Rate (FHT): 120 BPM  FHR Category: I               Vital Signs:   Vitals:    11/01/24 0400   BP: 91/62   Pulse: 65   Resp:    Temp:    SpO2:        Notes/comments:   SVE as above. Amniotomy performed for clear fluid. FHT Cat I. Continue pitocin titration. María would like an epidural now, plan for fluid bolus and anesthesia contacted. Repeat SVE in 2 hours or sooner if clinically indicated.     Jannette Zavala MD 11/1/2024 4:40 AM

## 2024-11-01 NOTE — L&D DELIVERY NOTE
Vaginal Delivery Summary - OB/GYN   María Prasad 39 y.o. female MRN: [id number]  Unit/Bed#: -01 Encounter: [id number]      Predelivery Diagnosis:  1. Pregnancy at 40w  2.  Induction of labor  3. Hep B nonimmune  4. Von Willebrand disease      Postdelivery Diagnosis:  1. Same as above  2. Delivery of term     Procedure: Spontaneous Vaginal Delivery    Attending: Dr. Kraus    Assistant: Dr. Zavala, Dr. Mercer    Anesthesia: Epidural    QBL: 50 cc  Admission H.4 g/dL  Admission platelets: 135k    Complications: none apparent    Specimens: cord blood, arterial and venous cord blood gasses, placenta to storage    Findings:   1. Viable female at 0534, with APGARS of 8 and 9 at 1 and 5 minutes respectively,  2. Spontaneous delivery of intact placenta   3. Blood gases:    Umbilical Cord Venous Blood Gas:  Results from last 7 days   Lab Units 24  0540   PH COV  7.364   PCO2 COV mm HG 32.6   HCO3 COV mmol/L 18.2   BASE EXC COV mmol/L -6.1*   O2 CT CD VB mL/dL 15.8   O2 HGB, VENOUS CORD % 75.3     Umbilical Cord Arterial Blood Gas:  Results from last 7 days   Lab Units 24  0540   PH COA  7.310   PCO2 COA  43.2   PO2 COA mm HG 23.1   HCO3 COA mmol/L 21.3   BASE EXC COA mmol/L -4.9*   O2 CONTENT CORD ART ml/dl 13.0   O2 HGB, ARTERIAL CORD % 56.5       Disposition:  Patient tolerated the procedure well and was recovering in labor and delivery room     Brief history and labor course:  Ms. María Prasad is a 39 y.o. now  at 40wk0d. She presented to labor and delivery for elective induction of labor. SVE on admission was 4/70/-3. Pitocin was started for her induction. She received an epidural for pain control. An amniotomy was performed for clear fluid. She progressed to complete dilation and began pushing.       Description of procedure    After pushing for 1 minute, at 0534 patient delivered a viable female , wt pending, apgars of 8 (1 min) and 9 (5 min). The fetal vertex delivered  spontaneously. Baby restituted to MINERVA. Baby was checked for nuchal. There was no nuchal. The anterior left shoulder delivered atraumatically with maternal expulsive forces and the assistance of downward traction. The posterior shoulder delivered with maternal expulsive forces and the assistance of upward traction. The remainder of the fetus delivered spontaneously.     Upon delivery, the infant was placed on the mothers abdomen and the cord was clamped and cut. Delayed cord clamping was performed.  The infant was noted to cry spontaneously and was moving all extremities appropriately. There was no evidence for injury. Awaiting nurse resuscitators evaluated the . Arterial and venous cord blood gases and cord blood was collected for analysis. These were promptly sent to the lab. In the immediate post-partum, 30 units of IV pitocin was administered, and the uterus was noted to contract down well with massage and pitocin. The placenta delivered spontaneously at 0542 and was noted to have a centrally inserted 3 vessel cord.     The vagina, cervix, perineum, and rectum were inspected and there was noted to be no lacerations. Due to her Von Willebrand's disease, 1g of TXA was given prophylactically at time of delivery.     At the conclusion of the procedure, all needle, sponge, and instrument counts were noted to be correct. Patient tolerated the procedure well and was allowed to recover in labor and delivery room with family and  before being transferred to the post-partum floor. Dr. Kraus was present and participated in all key portions of the case.        Jannette Zavala MD  2024  5:53 AM

## 2024-11-01 NOTE — ANESTHESIA PREPROCEDURE EVALUATION
Procedure:  LABOR ANALGESIA    Relevant Problems   GYN   (+) 39 weeks gestation of pregnancy      HEMATOLOGY   (+) Von Willebrand disease (HCC)        Physical Exam    Airway    Mallampati score: II  TM Distance: >3 FB  Neck ROM: full     Dental       Cardiovascular  Cardiovascular exam normal    Pulmonary  Pulmonary exam normal     Other Findings  post-pubertal.      Anesthesia Plan  ASA Score- 3     Anesthesia Type- epidural with ASA Monitors.         Additional Monitors:     Airway Plan:            Plan Factors-    Chart reviewed.   Existing labs reviewed. Patient summary reviewed.                  Induction-     Postoperative Plan-     Perioperative Resuscitation Plan - Level 1 - Full Code.       Informed Consent- Anesthetic plan and risks discussed with patient.

## 2024-11-01 NOTE — OB LABOR/OXYTOCIN SAFETY PROGRESS
Oxytocin Safety Progress Check Note - María Prasad 39 y.o. female MRN: [id number]    Unit/Bed#: -01 Encounter: [id number]    Dose (latricia-units/min) Oxytocin: 14 latricia-units/min  Contraction Frequency (minutes): 1.5-3  Contraction Intensity: Mild/Moderate  Uterine Activity Characteristics: Occasional  Cervical Dilation: 4-5        Cervical Effacement: 70  Fetal Station: -2  Baseline Rate (FHR): 125 bpm  Fetal Heart Rate (FHT): 135 BPM  FHR Category: I               Vital Signs:   Vitals:    11/01/24 0130   BP: 107/66   Pulse: 67   Resp:    Temp:    SpO2:        Notes/comments:   SVE as above. FHT Cat I, contractions q1-3 minutes. Plan for AROM with next check if fetal head well applied. Repeat SVE in 2 hours or sooner if clinically indicated. Dr. Kraus aware.    Jannette Zavala MD 11/1/2024 1:50 AM

## 2024-11-01 NOTE — DISCHARGE SUMMARY
Discharge Summary - OB/GYN   María Prasda 39 y.o. female MRN: [id number]  Unit/Bed#: -01 Encounter: [id number]      Admission Date: 10/31/2024     Discharge Date: 24    Admitting Diagnosis:   Patient Active Problem List   Diagnosis    Request for sterilization    Von Willebrand disease (HCC)    39 weeks gestation of pregnancy    Late prenatal care    Lack of immunity to hepatitis B virus demonstrated by serologic test    Suboxone maintenance treatment complicating pregnancy, antepartum (HCC)    Encounter for induction of labor        Discharge Diagnosis:   Same, delivered      Procedures: spontaneous vaginal delivery    Delivery Attending: Edgar Kraus MD  Discharge Attending: Dr Stevens    [facility] Course:   Ms. María Prasad is a 39 y.o. now  at 40wk0d. She presented to labor and delivery for elective induction of labor. SVE on admission was 4/70/-3. Pitocin was started for her induction. She received an epidural for pain control. An amniotomy was performed for clear fluid. She progressed to complete dilation and began pushing.     She delivered a viable female  on 2024 at 0534. Weight 7lbs 12.5oz via spontaneous vaginal delivery. Apgars were 8 (1 min) and 9 (5 min).  was transferred to  nursery. Patient tolerated the procedure well and was transferred to recovery in stable condition.     Her postpartum course was complicated by VWD managed with prophylactic TXA. Her postpartum pain was well controlled with oral analgesics.    On day of discharge, she was ambulating and able to reasonably perform all ADLs. She was voiding and had appropriate bowel function. Pain was well controlled. She was discharged home on post-partum day #1 without complications. Patient was instructed to follow up with her OB as an outpatient and was given appropriate warnings to call provider if she develops signs of infection or uncontrolled pain.    Complications: none  apparent    Condition at discharge: good     Discharge instructions/Information to patient and family:   - Do not place anything (no partner, tampons or douche) in your vagina for 6 weeks.  -You may walk for exercise for the first 6 weeks then gradually return to your usual activities.   -Please do not drive for 1 week if you have no stitches and for 2 weeks if you have stitches or underwent a  delivery.    -You may take baths or shower per your preference.   -Please look at your bust (breasts) in the mirror daily and call for redness or tenderness or increased warmth.   -Please call us for temperature > 100.4*F or 38* C, worsening pain or a foul discharge.      Discharge Medications:   Prenatal vitamin daily for 6 months or the duration of nursing whichever is longer.  Motrin 600 mg orally every 6 hours as needed for pain  Tylenol (over the counter) per bottle directions as needed for pain: do NOT use with percocet  Hydrocortisone cream 1% (over the counter) applied 1-2x daily to hemorrhoids as needed    Planned Readmission: No

## 2024-11-01 NOTE — ANESTHESIA PROCEDURE NOTES
Epidural Block    Patient location during procedure: OB/L&D  Reason for block: procedure for pain  Staffing  Performed by: Refugio Head MD  Authorized by: Refugio Head MD    Preanesthetic Checklist  Completed: patient identified, IV checked, site marked, risks and benefits discussed, surgical consent, monitors and equipment checked, pre-op evaluation and timeout performed  Epidural  Patient position: sitting  Prep: ChloraPrep  Sedation Level: no sedation  Patient monitoring: frequent blood pressure checks, continuous pulse oximetry and heart rate  Approach: midline  Location: lumbar, L3-4  Injection technique: SENIA air  Needle  Needle type: Tuohy   Needle gauge: 18 G  Catheter type: multi-orifice  Catheter size: 20 G  Catheter at skin depth: 13 cm  Catheter securement method: stabilization device and clear occlusive dressing  Test dose: negative  Assessment  Sensory level: T10  Number of attempts: 1negative aspiration for CSF, negative aspiration for heme and no paresthesia on injection  patient tolerated the procedure well with no immediate complications

## 2024-11-01 NOTE — OB LABOR/OXYTOCIN SAFETY PROGRESS
Oxytocin Safety Progress Check Note - María Prasad 39 y.o. female MRN: [id number]    Unit/Bed#: -01 Encounter: [id number]    Dose (latricia-units/min) Oxytocin: 8 latricia-units/min  Contraction Frequency (minutes): 2.5-4  Contraction Intensity: Mild  Uterine Activity Characteristics: Occasional  Cervical Dilation: 4        Cervical Effacement: 70  Fetal Station: -3  Baseline Rate (FHR): 125 bpm  Fetal Heart Rate (FHT): 120 BPM  FHR Category: I               Vital Signs:   Vitals:    10/31/24 2300   BP: 109/65   Pulse: 78   Resp:    Temp:    SpO2:        Notes/comments:   SVE as above. FHT Cat I. Continue pitocin titration. Repeat SVE in  2hours or sooner if clinically indicated. Plan to AROM with next check if fetal head well applied.     Jannette Zavala MD 10/31/2024 11:09 PM

## 2024-11-01 NOTE — PLAN OF CARE

## 2024-11-01 NOTE — CASE MANAGEMENT
Case Management Progress Note    Patient name María Prasad  Location /-01 MRN [id number]  : 1985 Date 2024       LOS (days): 1  Geometric Mean LOS (GMLOS) (days):   Days to GMLOS:        OBJECTIVE:        Current admission status: Inpatient  Preferred Pharmacy:   [id number] - [place], [place] - [address]  [address]  [place] [address]  Phone: [phone] Fax: [phone]    Primary Care Provider: Emil Andrews DO    Primary Insurance: [employer]  Secondary Insurance:     PROGRESS NOTE:      CM met with MOB to introduce CM services, complete assessment, and provide CM contact info.    MOB had Significant Other present and verbalized agreement with personal interview with them present.    MOB reported the following:    Assessment:  Consult reason: Drug/ETOH/MH- Suboxone   Gestational Age at Birth: 40 Weeks + 0 Days  MOB Name (& age if teen):   María Prasad  FOB Name (& age if teen MOB): Christopher Roman   Other Legal Guardian(s) for Baby:   n/a   Other Children:   5 other children (19, 11, 8, 2, 1)  Housing Plan/Lives with:   MOB reported she lives alone.  She reported her children live with different friend or family members.   Insurance Coverage/Plan for Baby: MOB verbalizes that they will contact their insurance to add baby ASAP.   Support System: Family and Friends  Care Items: Car Seat, Crib/Bassinet (Safe Sleep Space), Diapers/Wipes, and Clothing  Method of Feeding: Formula  Breast Pump: Declines need for breast pump  Government Assistance Programs: SNAP (Supplemental Nutrition Assistance Program)- CM encouraged MOB to apply for WIC, MOB in agreement.   Arrangements: MOB  Current Employment/Schooling: MOB unemployed, reported she has a job lined up for next month.  Mental Health History and/or Treatment:  MOB reported history of anxiety.   Substance Use History and/or Treatment: MOB declined any SA history  or treatment.  She reported she is currently prescribed Subxone by [facility]. Chart indicates substance use history.      Urine Drug Screen Results: Negative  Children & Youth History: None- MOB denied any CYS involvement.  Current Legal Issues: Probation- MOB reported she is currently on probation.  Chart indicated ONEIL was incarcerated in September.   Domestic/Intimate Partner Violence History: Denies.  NICU Resources: N/A    Discharge Plan:  Pediatrician:   TBD  Prenatal/ Care:TBD    Follow-Up Appointments Needed/Scheduled: TBD  Medications/DME/Other Referrals: TBD  Transportation Plan: ONEIL has a vehicle    Follow-Up Needed from Care Management:        CM made CYS referral (e-Referral ID: [id number]) CM will continue to follow for discharge clearance.       No

## 2024-11-01 NOTE — ANESTHESIA POSTPROCEDURE EVALUATION
Post-Op Assessment Note    CV Status:  Stable  Pain Score: 0    Pain management: adequate      Post-op block assessment: no complications   Mental Status:  Alert and awake   Hydration Status:  Euvolemic   PONV Controlled:  Controlled   Airway Patency:  Patent     Post Op Vitals Reviewed: Yes    No anethesia notable event occurred.    Staff: Anesthesiologist           Last Filed PACU Vitals:  Vitals Value Taken Time   Temp     Pulse 61 11/01/24 0809   BP 98/54 11/01/24 0809   Resp     SpO2     Vitals shown include unfiled device data.    Modified Suzanne:  No data recorded

## 2024-11-01 NOTE — OB LABOR/OXYTOCIN SAFETY PROGRESS
Oxytocin Safety Progress Check Note - María Prasad 39 y.o. female MRN: [id number]    Unit/Bed#: -01 Encounter: [id number]    Dose (latricia-units/min) Oxytocin: 14 latricia-units/min  Contraction Frequency (minutes): 1.5-3  Contraction Intensity: Mild/Moderate  Uterine Activity Characteristics: Occasional  Cervical Dilation: 4-5        Cervical Effacement: 70  Fetal Station: -2  Baseline Rate (FHR): 125 bpm  Fetal Heart Rate (FHT): 120 BPM  FHR Category: I               Vital Signs:   Vitals:    11/01/24 0345   BP: 106/71   Pulse: 59   Resp:    Temp:    SpO2:        Notes/comments:   FHT Cat I with baseline 120 with moderate variability, 15x15 accelerations, and no decelerations. Contractions q2-3. Continue pitocin titration.    Jannette Zavala MD 11/1/2024 4:05 AM

## 2024-11-02 LAB
ABO GROUP BLD BPU: NORMAL
BPU ID: NORMAL
CROSSMATCH: NORMAL
UNIT DISPENSE STATUS: NORMAL
UNIT PRODUCT CODE: NORMAL
UNIT PRODUCT VOLUME: 350 ML
UNIT RH: NORMAL

## 2024-11-02 PROCEDURE — 99024 POSTOP FOLLOW-UP VISIT: CPT | Performed by: OBSTETRICS & GYNECOLOGY

## 2024-11-02 RX ORDER — BENZOCAINE/MENTHOL 6 MG-10 MG
1 LOZENGE MUCOUS MEMBRANE DAILY PRN
Start: 2024-11-02

## 2024-11-02 RX ORDER — ACETAMINOPHEN AND CODEINE PHOSPHATE 120; 12 MG/5ML; MG/5ML
1 SOLUTION ORAL DAILY
Qty: 28 TABLET | Refills: 12 | Status: SHIPPED | OUTPATIENT
Start: 2024-11-02 | End: 2025-11-02

## 2024-11-02 RX ORDER — TRANEXAMIC ACID 650 MG/1
1300 TABLET ORAL 3 TIMES DAILY
Qty: 38 TABLET | Refills: 0 | Status: SHIPPED | OUTPATIENT
Start: 2024-11-02 | End: 2024-11-09

## 2024-11-02 RX ADMIN — BUPRENORPHINE AND NALOXONE 8 MG: 8; 2 FILM BUCCAL; SUBLINGUAL at 09:16

## 2024-11-02 RX ADMIN — IBUPROFEN 600 MG: 600 TABLET, FILM COATED ORAL at 00:11

## 2024-11-02 RX ADMIN — TRANEXAMIC ACID 1300 MG: 650 TABLET ORAL at 09:16

## 2024-11-02 RX ADMIN — TRANEXAMIC ACID 1300 MG: 650 TABLET ORAL at 21:24

## 2024-11-02 RX ADMIN — TRANEXAMIC ACID 1300 MG: 650 TABLET ORAL at 17:20

## 2024-11-02 RX ADMIN — BUPRENORPHINE AND NALOXONE 8 MG: 8; 2 FILM BUCCAL; SUBLINGUAL at 21:24

## 2024-11-02 RX ADMIN — TRANEXAMIC ACID 1300 MG: 650 TABLET ORAL at 00:11

## 2024-11-02 NOTE — ASSESSMENT & PLAN NOTE
Recovering well   Routine postpartum care, encourage ambulation, encourage familial bonding  Lactation support for breast/bottle feeding as needed  Pre-delivery Hgb 12.4  FEN: Tolerating regular diet  Pain: Motrin/Tylenol around the clock, oxycodone if needed  Appropriate bowel and bladder function   Postpartum contraception plan: POP bridge to bilateral salpingectomy  DVT Ppx: Ambulation

## 2024-11-02 NOTE — PROGRESS NOTES
Progress Note - OB/GYN  María Prasad 39 y.o. female MRN: [id number]  Unit/Bed#: -01 Encounter: [id number]    Assessment and Plan:   María Prasad is a patient of: [facility]. She is PPD# 1 s/p  spontaneous vaginal delivery  Recovering well and is stable.     Suboxone maintenance treatment complicating pregnancy, antepartum (HCC)  Assessment & Plan  Home regimen ordered: Suboxone 8-2 mg BID  Verified through PDMP    Lack of immunity to hepatitis B virus demonstrated by serologic test  Assessment & Plan  Offer vaccine postpartum    Von Willebrand disease (HCC)  Assessment & Plan  VWF antigen and factor Vlll levels normal     Heme recs: 7 days prophylactic TXA postpartum  Fetal recs: avoidance of fetal scalp electrode and operative vaginal delivery. Newborns that have a risk of only mild von Willebrand's can receive normal  care but elective procedures such as circumcision should be deferred until the von Willebrand's factor and factor 8 activity levels have been confirmed.    Request for sterilization  Assessment & Plan  MA 31 signed 2024  Plan POP bridge    *  (spontaneous vaginal delivery)  Assessment & Plan  Recovering well   Routine postpartum care, encourage ambulation, encourage familial bonding  Lactation support for breast/bottle feeding as needed  Pre-delivery Hgb 12.4  FEN: Tolerating regular diet  Pain: Motrin/Tylenol around the clock, oxycodone if needed  Appropriate bowel and bladder function   Postpartum contraception plan: POP bridge to bilateral salpingectomy  DVT Ppx: Ambulation         Disposition    - Anticipate discharge home on PPD# 1-2  Barriers to discharge: None     Zoie Cole MD  OBGYN PGY-1  2024 5:38 AM    Subjective/Objective     Chief Complaint: Postpartum State     Subjective:    María Prasad is PPD#1 s/p  spontaneous vaginal delivery. She has no current complaints and had no acute events overnight.  Pain is well controlled.  Patient  "is currently voiding.  She is ambulating.  Patient is currently passing flatus and has had bowel movement. She is tolerating PO, and denies nausea or vomitting. Patient denies fever, chills, chest pain, shortness of breath, or calf tenderness. Lochia is normal. She is  Breastfeeding. She is recovering well and is stable.       Vitals:   BP 94/55 (BP Location: Right arm)   Pulse (!) 54   Temp 98.2 °F (36.8 °C) (Oral)   Resp 16   Ht 5' 5\" (1.651 m)   Wt 73.5 kg (162 lb)   LMP 03/15/2024 (Approximate)   SpO2 99%   Breastfeeding No   BMI 26.96 kg/m²       Intake/Output Summary (Last 24 hours) at 11/2/2024 0538  Last data filed at 11/1/2024 1601  Gross per 24 hour   Intake --   Output 500 ml   Net -500 ml       Physical Exam:   GEN: María Prasad appears well, alert and oriented x 3, pleasant and cooperative   CARDIO: RRR, intact distals pulses  RESP:  non-labored breathing  ABDOMEN: soft, no tenderness, fundus below umbilicus  EXTREMITIES: Non tender, no erythema    Labs:   Recent Results (from the past 72 hour(s))   Type and screen    Collection Time: 10/31/24  6:03 PM   Result Value Ref Range    ABO Grouping O     Rh Factor Positive     Antibody Screen Negative     Specimen Expiration Date 20241103    CBC    Collection Time: 10/31/24  6:03 PM   Result Value Ref Range    WBC 9.37 4.31 - 10.16 Thousand/uL    RBC 4.19 3.81 - 5.12 Million/uL    Hemoglobin 12.4 11.5 - 15.4 g/dL    Hematocrit 37.8 34.8 - 46.1 %    MCV 90 82 - 98 fL    MCH 29.6 26.8 - 34.3 pg    MCHC 32.8 31.4 - 37.4 g/dL    RDW 13.7 11.6 - 15.1 %    Platelets 135 (L) 149 - 390 Thousands/uL    MPV 12.0 8.9 - 12.7 fL   L&D GREEN / YELLOW ON HOLD    Collection Time: 10/31/24  6:03 PM   Result Value Ref Range    Extra Tube hold recieved    RPR-Syphilis Screening (Total Syphilis IGG/IGM)    Collection Time: 10/31/24  6:03 PM   Result Value Ref Range    Syphilis Total Antibody Non-reactive Non-Reactive   Prepare Leukoreduced RBC: 1 Units    Collection " Time: 10/31/24  6:56 PM   Result Value Ref Range    Unit Product Code U9423M52     Unit Number [id number]     Unit ABO O     Unit RH POS     Crossmatch Compatible     Unit Dispense Status Crossmatched     Unit Product Volume 350 ml   CORD, Blood gas, arterial    Collection Time: 11/01/24  5:40 AM   Result Value Ref Range    pH, Cord Art 7.310 7.230 - 7.430    pCO2, Cord Art 43.2 30.0 - 60.0    pO2, Cord Art 23.1 5.0 - 25.0 mm HG    HCO3, Cord Art 21.3 17.3 - 27.3 mmol/L    Base Exc, Cord Art -4.9 (L) 3.0 - 11.0 mmol/L    O2 Content, Cord Art 13.0 ml/dl    O2 Hgb, Arterial Cord 56.5 %   CORD, Blood gas, venous    Collection Time: 11/01/24  5:40 AM   Result Value Ref Range    pH, Cord Derian 7.364 7.190 - 7.490    pCO2, Cord Derian 32.6 27.0 - 43.0 mm HG    pO2, Cord Derian 31.6 15.0 - 45.0 mm HG    HCO3, Cord Derian 18.2 12.2 - 28.6 mmol/L    Base Exc, Cord Derian -6.1 (L) 1.0 - 9.0 mmol/L    O2 Cont, Cord Derian 15.8 mL/dL    O2 HGB,VENOUS CORD 75.3 %   Rapid drug screen, urine    Collection Time: 11/01/24  8:46 AM   Result Value Ref Range    Amph/Meth UR Negative Negative    Barbiturate Ur Negative Negative    Benzodiazepine Urine Negative Negative    Cocaine Urine Negative Negative    Methadone Urine Negative Negative    Opiate Urine Negative Negative    PCP Ur Negative Negative    THC Urine Negative Negative    Oxycodone Urine Negative Negative    Fentanyl Urine Negative Negative    HYDROCODONE URINE Negative Negative       Meds:      Current Facility-Administered Medications:     acetaminophen (TYLENOL) tablet 650 mg, 650 mg, Oral, Q4H PRN, Jannette Zavala MD    benzocaine-menthol-lanolin-aloe (DERMOPLAST) 20-0.5 % topical spray 1 Application, 1 Application, Topical, Q6H PRN, Jannette Zavala MD    bupivacaine (PF) (MARCAINE) 0.25 % injection 30 mL, 30 mL, Infiltration, Once PRN, Jannette Zavala MD    buprenorphine-naloxone (Suboxone) film 8 mg, 8 mg, Sublingual, BID, Jannette Zavala MD, 8 mg at 11/01/24 2111    calcium carbonate (TUMS)  chewable tablet 1,000 mg, 1,000 mg, Oral, Daily PRN, Jannette Zavala MD    hydrocortisone 1 % cream 1 Application, 1 Application, Topical, Daily PRN, Jannette Zavala MD    ibuprofen (MOTRIN) tablet 600 mg, 600 mg, Oral, Q6H, Jannette Zavala MD, 600 mg at 11/02/24 0011    lactated ringers infusion, 125 mL/hr, Intravenous, Continuous, Jannette Zavala MD, Stopped at 11/01/24 0600    ondansetron (ZOFRAN) injection 4 mg, 4 mg, Intravenous, Q6H PRN, Jannette Zavala MD    ropivacaine 0.2% PCEA, , Epidural, Continuous, Refugio Head MD, Stopped at 11/01/24 0650    simethicone (MYLICON) chewable tablet 80 mg, 80 mg, Oral, 4x Daily PRN, Jannette Zavala MD    Tranexamic Acid tablet 1,300 mg, 1,300 mg, Oral, TID, Jannette Zavala MD, 1,300 mg at 11/02/24 0011    witch hazel-glycerin (TUCKS) topical pad 1 Pad, 1 Pad, Topical, Q4H PRN, Jannette Zavala MD

## 2024-11-02 NOTE — PLAN OF CARE

## 2024-11-02 NOTE — PLAN OF CARE

## 2024-11-02 NOTE — PLAN OF CARE

## 2024-11-03 VITALS
DIASTOLIC BLOOD PRESSURE: 65 MMHG | HEIGHT: 65 IN | WEIGHT: 162 LBS | TEMPERATURE: 98.1 F | SYSTOLIC BLOOD PRESSURE: 101 MMHG | OXYGEN SATURATION: 96 % | BODY MASS INDEX: 26.99 KG/M2 | HEART RATE: 64 BPM | RESPIRATION RATE: 16 BRPM

## 2024-11-03 PROCEDURE — NC001 PR NO CHARGE: Performed by: OBSTETRICS & GYNECOLOGY

## 2024-11-03 RX ORDER — ACETAMINOPHEN AND CODEINE PHOSPHATE 120; 12 MG/5ML; MG/5ML
1 SOLUTION ORAL DAILY
Qty: 28 TABLET | Refills: 12 | Status: SHIPPED | OUTPATIENT
Start: 2024-11-03 | End: 2025-11-03

## 2024-11-03 RX ADMIN — TRANEXAMIC ACID 1300 MG: 650 TABLET ORAL at 09:08

## 2024-11-03 RX ADMIN — BUPRENORPHINE AND NALOXONE 8 MG: 8; 2 FILM BUCCAL; SUBLINGUAL at 09:08

## 2024-11-03 RX ADMIN — TRANEXAMIC ACID 1300 MG: 650 TABLET ORAL at 16:15

## 2024-11-03 RX ADMIN — IBUPROFEN 600 MG: 600 TABLET, FILM COATED ORAL at 09:19

## 2024-11-03 NOTE — PLAN OF CARE
Problem: POSTPARTUM  Goal: Experiences normal postpartum course  Description: INTERVENTIONS:  - Monitor maternal vital signs  - Assess uterine involution and lochia  Outcome: Adequate for Discharge  Goal: Appropriate maternal -  bonding  Description: INTERVENTIONS:  - Identify family support  - Assess for appropriate maternal/infant bonding   -Encourage maternal/infant bonding opportunities  - Referral to  or  as needed  Outcome: Adequate for Discharge  Goal: Establishment of infant feeding pattern  Description: INTERVENTIONS:  - Assess breast/bottle feeding  - Refer to lactation as needed  Outcome: Adequate for Discharge     Problem: PAIN - ADULT  Goal: Verbalizes/displays adequate comfort level or baseline comfort level  Description: Interventions:  - Encourage patient to monitor pain and request assistance  - Assess pain using appropriate pain scale  - Administer analgesics based on type and severity of pain and evaluate response  - Implement non-pharmacological measures as appropriate and evaluate response  - Consider cultural and social influences on pain and pain management  - Notify physician/advanced practitioner if interventions unsuccessful or patient reports new pain  Outcome: Adequate for Discharge     Problem: INFECTION - ADULT  Goal: Absence or prevention of progression during hospitalization  Description: INTERVENTIONS:  - Assess and monitor for signs and symptoms of infection  - Monitor lab/diagnostic results  - Monitor all insertion sites, i.e. indwelling lines, tubes, and drains  - Monitor endotracheal if appropriate and nasal secretions for changes in amount and color  - Pfeifer appropriate cooling/warming therapies per order  - Administer medications as ordered  - Instruct and encourage patient and family to use good hand hygiene technique  - Identify and instruct in appropriate isolation precautions for identified infection/condition  Outcome: Adequate for  Discharge  Goal: Absence of fever/infection during neutropenic period  Description: INTERVENTIONS:  - Monitor WBC    Outcome: Adequate for Discharge     Problem: SAFETY ADULT  Goal: Patient will remain free of falls  Description: INTERVENTIONS:  - Educate patient/family on patient safety including physical limitations  - Instruct patient to call for assistance with activity   - Consult OT/PT to assist with strengthening/mobility   - Keep Call bell within reach  - Keep bed low and locked with side rails adjusted as appropriate  - Keep care items and personal belongings within reach  - Initiate and maintain comfort rounds  - Make Fall Risk Sign visible to staff  - Offer Toileting every 2 Hours, in advance of need     - Apply yellow socks and bracelet for high fall risk patients  - Consider moving patient to room near nurses station  Outcome: Adequate for Discharge  Goal: Maintain or return to baseline ADL function  Description: INTERVENTIONS:  -  Assess patient's ability to carry out ADLs; assess patient's baseline for ADL function and identify physical deficits which impact ability to perform ADLs (bathing, care of mouth/teeth, toileting, grooming, dressing, etc.)  - Assess/evaluate cause of self-care deficits   - Assess range of motion  - Assess patient's mobility; develop plan if impaired  - Assess patient's need for assistive devices and provide as appropriate  - Encourage maximum independence but intervene and supervise when necessary  - Involve family in performance of ADLs  - Assess for home care needs following discharge   - Consider OT consult to assist with ADL evaluation and planning for discharge  - Provide patient education as appropriate  Outcome: Adequate for Discharge  Goal: Maintains/Returns to pre admission functional level  Description: INTERVENTIONS:  - Perform AM-PAC 6 Click Basic Mobility/ Daily Activity assessment daily.  - Set and communicate daily mobility goal to care team and  patient/family/caregiver.   - Collaborate with rehabilitation services on mobility goals if consulted  - Perform Range of Motion 3 times a day.  - Reposition patient every 2 hours.  - Dangle patient 3 times a day  - Stand patient 3 times a day  - Ambulate patient 3 times a day  - Out of bed to chair 3 times a day   - Out of bed for meals 3 times a day  - Out of bed for toileting  - Record patient progress and toleration of activity level   Outcome: Adequate for Discharge     Problem: Knowledge Deficit  Goal: Patient/family/caregiver demonstrates understanding of disease process, treatment plan, medications, and discharge instructions  Description: Complete learning assessment and assess knowledge base.  Interventions:  - Provide teaching at level of understanding  - Provide teaching via preferred learning methods  Outcome: Adequate for Discharge     Problem: DISCHARGE PLANNING  Goal: Discharge to home or other facility with appropriate resources  Description: INTERVENTIONS:  - Identify barriers to discharge w/patient and caregiver  - Arrange for needed discharge resources and transportation as appropriate  - Identify discharge learning needs (meds, wound care, etc.)  - Arrange for interpretive services to assist at discharge as needed  - Refer to Case Management Department for coordinating discharge planning if the patient needs post-hospital services based on physician/advanced practitioner order or complex needs related to functional status, cognitive ability, or social support system  Outcome: Adequate for Discharge

## 2024-11-03 NOTE — PROGRESS NOTES
Progress Note - OB/GYN  María Prasad 39 y.o. female MRN: [id number]  Unit/Bed#:  310-01 Encounter: [id number]    Assessment and Plan:   María Prasad is a patient of: [facility]. She is PPD# 2 s/p  spontaneous vaginal delivery  Recovering well and is stable.     Suboxone maintenance treatment complicating pregnancy, antepartum (HCC)  Assessment & Plan  Home regimen ordered: Suboxone 8-2 mg BID  Verified through PDMP     Von Willebrand disease (HCC)  Assessment & Plan  VWF antigen and factor Vlll levels normal     Heme recs: 7 days prophylactic TXA postpartum  Fetal recs: Newborns that have a risk of only mild von Willebrand's can receive normal  care but elective procedures such as circumcision should be deferred until the von Willebrand's factor and factor 8 activity levels have been confirmed.    Request for sterilization  Assessment & Plan  MA 31 signed 2024  Plan POP bridge     *  (spontaneous vaginal delivery)  Assessment & Plan  Recovering well   Routine postpartum care, encourage ambulation, encourage familial bonding  Lactation support for breast/bottle feeding as needed  Pre-delivery Hgb 12.4  FEN: Tolerating regular diet  Pain: Motrin/Tylenol around the clock, oxycodone if needed  Appropriate bowel and bladder function   Postpartum contraception plan: POP bridge to bilateral salpingectomy  DVT Ppx: Ambulation         Disposition    - Anticipate discharge home on PPD# 2  Barriers to discharge: None     Zoie Cole MD  OBGYN PGY-1  11/3/2024 7:05 AM    Subjective/Objective     Chief Complaint: Postpartum State     Subjective:    María Prasad is PPD#2 s/p  spontaneous vaginal delivery. She has no current complaints and had no acute events overnight.  Pain is well controlled.  Patient is currently voiding.  She is ambulating.  Patient is currently passing flatus and has had bowel movement. She is tolerating PO, and denies nausea or vomitting. Patient denies fever,  "chills, chest pain, shortness of breath, or calf tenderness. Lochia is normal. She is  Breastfeeding. She is recovering well and is stable.       Vitals:   /71 (BP Location: Left arm)   Pulse 77   Temp 97.8 °F (36.6 °C) (Oral)   Resp 18   Ht 5' 5\" (1.651 m)   Wt 73.5 kg (162 lb)   LMP 03/15/2024 (Approximate)   SpO2 95%   Breastfeeding No   BMI 26.96 kg/m²     No intake or output data in the 24 hours ending 11/03/24 0705    Physical Exam:   GEN: María Prasad appears well, alert and oriented x 3, pleasant and cooperative   CARDIO: RRR, intact distals pulses  RESP:  non-labored breathing  ABDOMEN: soft, no tenderness, fundus below umbilicus  EXTREMITIES: Non tender, no erythema    Labs:   Recent Results (from the past 72 hour(s))   Type and screen    Collection Time: 10/31/24  6:03 PM   Result Value Ref Range    ABO Grouping O     Rh Factor Positive     Antibody Screen Negative     Specimen Expiration Date 20241103    CBC    Collection Time: 10/31/24  6:03 PM   Result Value Ref Range    WBC 9.37 4.31 - 10.16 Thousand/uL    RBC 4.19 3.81 - 5.12 Million/uL    Hemoglobin 12.4 11.5 - 15.4 g/dL    Hematocrit 37.8 34.8 - 46.1 %    MCV 90 82 - 98 fL    MCH 29.6 26.8 - 34.3 pg    MCHC 32.8 31.4 - 37.4 g/dL    RDW 13.7 11.6 - 15.1 %    Platelets 135 (L) 149 - 390 Thousands/uL    MPV 12.0 8.9 - 12.7 fL   L&D GREEN / YELLOW ON HOLD    Collection Time: 10/31/24  6:03 PM   Result Value Ref Range    Extra Tube hold recieved    RPR-Syphilis Screening (Total Syphilis IGG/IGM)    Collection Time: 10/31/24  6:03 PM   Result Value Ref Range    Syphilis Total Antibody Non-reactive Non-Reactive   CORD, Blood gas, arterial    Collection Time: 11/01/24  5:40 AM   Result Value Ref Range    pH, Cord Art 7.310 7.230 - 7.430    pCO2, Cord Art 43.2 30.0 - 60.0    pO2, Cord Art 23.1 5.0 - 25.0 mm HG    HCO3, Cord Art 21.3 17.3 - 27.3 mmol/L    Base Exc, Cord Art -4.9 (L) 3.0 - 11.0 mmol/L    O2 Content, Cord Art 13.0 ml/dl    O2 " Hgb, Arterial Cord 56.5 %   CORD, Blood gas, venous    Collection Time: 11/01/24  5:40 AM   Result Value Ref Range    pH, Cord Derian 7.364 7.190 - 7.490    pCO2, Cord Derian 32.6 27.0 - 43.0 mm HG    pO2, Cord Derian 31.6 15.0 - 45.0 mm HG    HCO3, Cord Derian 18.2 12.2 - 28.6 mmol/L    Base Exc, Cord Derian -6.1 (L) 1.0 - 9.0 mmol/L    O2 Cont, Cord Derian 15.8 mL/dL    O2 HGB,VENOUS CORD 75.3 %   Rapid drug screen, urine    Collection Time: 11/01/24  8:46 AM   Result Value Ref Range    Amph/Meth UR Negative Negative    Barbiturate Ur Negative Negative    Benzodiazepine Urine Negative Negative    Cocaine Urine Negative Negative    Methadone Urine Negative Negative    Opiate Urine Negative Negative    PCP Ur Negative Negative    THC Urine Negative Negative    Oxycodone Urine Negative Negative    Fentanyl Urine Negative Negative    HYDROCODONE URINE Negative Negative   Prepare Leukoreduced RBC: 1 Units    Collection Time: 11/02/24  6:56 AM   Result Value Ref Range    Unit Product Code X4700F03     Unit Number [id number]     Unit ABO O     Unit RH POS     Crossmatch Compatible     Unit Dispense Status Return to Inv     Unit Product Volume 350 ml       Meds:      Current Facility-Administered Medications:     acetaminophen (TYLENOL) tablet 650 mg, 650 mg, Oral, Q4H PRN, Jannette Zavala MD    benzocaine-menthol-lanolin-aloe (DERMOPLAST) 20-0.5 % topical spray 1 Application, 1 Application, Topical, Q6H PRN, Jannette Zavala MD    bupivacaine (PF) (MARCAINE) 0.25 % injection 30 mL, 30 mL, Infiltration, Once PRN, Jannette Zavala MD    buprenorphine-naloxone (Suboxone) film 8 mg, 8 mg, Sublingual, BID, Jannette Zavala MD, 8 mg at 11/02/24 4941    calcium carbonate (TUMS) chewable tablet 1,000 mg, 1,000 mg, Oral, Daily PRN, Jannette Zavala MD    hydrocortisone 1 % cream 1 Application, 1 Application, Topical, Daily PRN, Jannette Zavala MD    ibuprofen (MOTRIN) tablet 600 mg, 600 mg, Oral, Q6H, Jannette Zavala MD, 600 mg at 11/02/24 0011    lactated ringers infusion,  125 mL/hr, Intravenous, Continuous, Jannette Zavala MD, Stopped at 11/01/24 0600    ondansetron (ZOFRAN) injection 4 mg, 4 mg, Intravenous, Q6H PRN, Jannette Zavala MD    ropivacaine 0.2% PCEA, , Epidural, Continuous, Refugio Head MD, Stopped at 11/01/24 0650    simethicone (MYLICON) chewable tablet 80 mg, 80 mg, Oral, 4x Daily PRN, Jannette Zavala MD    Tranexamic Acid tablet 1,300 mg, 1,300 mg, Oral, TID, Jannette Zavala MD, 1,300 mg at 11/02/24 2124    witch hazel-glycerin (TUCKS) topical pad 1 Pad, 1 Pad, Topical, Q4H PRN, Jannette Zavala MD

## 2024-11-03 NOTE — ASSESSMENT & PLAN NOTE
VWF antigen and factor Vlll levels normal     Heme recs: 7 days prophylactic TXA postpartum  Fetal recs: Newborns that have a risk of only mild von Willebrand's can receive normal  care but elective procedures such as circumcision should be deferred until the von Willebrand's factor and factor 8 activity levels have been confirmed.

## 2024-11-04 NOTE — UTILIZATION REVIEW
"    MOTHER DISCHARGED NOV 3, BABY DETAINED    NOTIFICATION OF INPATIENT ADMISSION   MATERNITY/DELIVERY AUTHORIZATION REQUEST   SERVICING FACILITY:   [facility] - L&D, , NICU  [address] [place] [address]  Tax ID: [id number]  NPI: [id number]   ATTENDING PROVIDER:  Attending Name and NPI#: Eulalia Murillo Md [[id number]]  Address: [address] [place] [address]  Phone: [phone]   ADMISSION INFORMATION:  Place of Service: Inpatient [facility]  Place of Service Code: 21  Inpatient Admission Date/Time: 10/31/24  4:25 PM  Discharge Date/Time: 11/3/2024  4:48 PM  Admitting Diagnosis Code/Description:  Encounter for elective induction of labor [Z34.90]  Encounter for full-term uncomplicated delivery [O80]     Mother: María Prasad 1985 Estimated Date of Delivery: 24  Delivering clinician: Edgar Kraus   OB History          6    Para   6    Term   6            AB   0    Living   6         SAB        IAB   0    Ectopic        Multiple   0    Live Births   6                Name & MRN:   Information for the patient's :  Osmar, Baby Girl (María) [[id number]]   [place] Delivery Information:  Sex: female  Delivered 2024 5:34 AM by Vaginal, Spontaneous; Gestational Age: 40w0d    [place] Measurements:  Weight: 7 lb 12.5 oz (3530 g);  Height: 19.5\"    APGAR 1 minute 5 minutes 10 minutes   Totals: 8 9       UTILIZATION REVIEW CONTACT:  Emma Roca, Utilization   Network Utilization Review Department  Phone: [phone]  Fax [phone]  Email: [email or web address]  Contact for approvals/pending authorizations, clinical reviews, and discharge.     PHYSICIAN ADVISORY SERVICES:  Medical Necessity Denial & [place] Review  Phone: [phone]  Fax: [phone]  Email: [email or web address]     DISCHARGE SUPPORT TEAM:  For Patients Discharge Needs & " Updates  Phone: [phone] opt. 2 Fax: [phone]  Email: [email or web address]     NOTIFICATION OF ADMISSION DISCHARGE   This is a Notification of Discharge from [facility]. Please be advised that this patient has been discharge from our facility. Below you will find the admission and discharge date and time including the patient’s disposition.   UTILIZATION REVIEW CONTACT:  Jazzy Garcia  Utilization   Network Utilization Review Department  Phone: [phone] x carefully listen to the prompts. All voicemails are confidential.  Email: [email or web address]     ADMISSION INFORMATION  PRESENTATION DATE: 10/31/2024  4:25 PM  OBERVATION ADMISSION DATE: N/A  INPATIENT ADMISSION DATE: 10/31/24  4:25 PM   DISCHARGE DATE: 11/3/2024  4:48 PM   DISPOSITION:Home/Self Care    Network Utilization Review Department  ATTENTION: Please call with any questions or concerns to [phone] and carefully listen to the prompts so that you are directed to the right person. All voicemails are confidential.   For Discharge needs, contact Care Management DC Support Team at [phone] opt. 2  Send all requests for admission clinical reviews, approved or denied determinations and any other requests to dedicated fax number below belonging to the campus where the patient is receiving treatment. List of dedicated fax numbers for the Facilities:  FACILITY NAME UR FAX NUMBER   ADMISSION DENIALS (Administrative/Medical Necessity) [phone]   DISCHARGE SUPPORT TEAM ([facility]) [phone]   PARENT CHILD HEALTH (Maternity/NICU/Pediatrics) [phone]   [facility] [phone]   [facility] [phone]   [facility] [phone]   [facility] [phone]   [facility] [phone]   [facility] [phone]   [facility]  [facility] [phone]   [facility] [phone]   [facility] [phone]   [facility] [phone]   [facility] [phone]   [facility] [phone]

## 2024-11-05 ENCOUNTER — TELEPHONE (OUTPATIENT)
Dept: OBGYN CLINIC | Facility: CLINIC | Age: 39
End: 2024-11-05

## 2024-11-05 NOTE — TELEPHONE ENCOUNTER
----- Message from Zoie Cole MD sent at 11/3/2024  9:11 AM EST -----  Regarding: Expedited Tubal  [facility] OB GYN Department  Surgery Scheduling Sheet    Patient Name: María Prasad  : 1985    Provider:  Zoie Cole MD     Needed: no; Language: N/A    Procedure: exam under anesthesia, bilateral salpingectomy    Diagnosis: request for permanent sterilization    Special Needs or Equipment: none    Anesthesia: general    Length of stay: outpatient  Does patient have comorbid conditions that will require close perioperative monitoring prior to safe discharge: Yes,     The patient has comorbid conditions that will require close perioperative monitoring prior to safe discharge, including   VIOLETTE on Suboxone  This may require acute care beyond the usual and routine recovery period. As such, inpatient admission post-operatively is expected and appropriate, and anticipated hospital length of stay will be >2 midnights.    Pre-Admission Testing Needed: no   Labs that should be ordered: cbc, type and screen, and BMP    Order PAT that is recommended in prep for procedure?: No    Medical Clearance Needed: no; Provider: N/A    MA Form Signed (tubals/hysterectomy): Yes    Surgical Drink Given: no    How many days out of work: 1 to 2 weeks  How many days no drivin to 5 day, no driving on opioids    Is pre op appt needed?  Pre-op and Post-partum visit can be combined  Interval for post op appt: 2 weeks     For Surgical Scheduler:     Surgery Scheduled On:  [place]: [facility]    Pre-op Appt:   Post op Appt:  Consult/Medical clearance appt:

## 2024-11-05 NOTE — TELEPHONE ENCOUNTER
Transition of Care Post Partum phone call: Congratulations.    Date of delivery: 2024  Weeks gestation: full term 40 weeks  Type of delivery: vaginal delivery  Sex of child: female  Name of child:   Birth weight: 3530 gm 7  lbs 12ounces  Perineum laceration: No   How are you feeling: good  Vaginal bleeding status: moderate  Urinary status: none  Bowel movement: Yes  Incision status: NA   Pain control: none  Esmond feeding: Bottle feeding   Any baby blues: No  Scheduled for follow-up appointment: patient will call to schedule once she checks baby schedule.

## 2024-11-07 LAB — PLACENTA IN STORAGE: NORMAL

## 2024-11-12 ENCOUNTER — TELEPHONE (OUTPATIENT)
Dept: OBGYN CLINIC | Facility: CLINIC | Age: 39
End: 2024-11-12

## 2024-11-12 NOTE — DISCHARGE SUMMARY
Discharge Summary - OB/GYN   Name: María Prasad 39 y.o. female I MRN: [id number]  Unit/Bed#: -01 I Date of Admission: 10/31/2024      ADMISSION  Patient of: [facility]  Admission Date: 10/31/2024   Admitting Attending: Dr. Payan att. providers found  Admitting Diagnoses:   Patient Active Problem List   Diagnosis    Request for sterilization    Von Willebrand disease (HCC)    39 weeks gestation of pregnancy    Late prenatal care    Lack of immunity to hepatitis B virus demonstrated by serologic test    Suboxone maintenance treatment complicating pregnancy, antepartum (HCC)    Encounter for induction of labor     (spontaneous vaginal delivery)       DELIVERY  Delivery Method: Vaginal, Spontaneous   Delivery Date and Time: 2024 5:34 AM  Delivery Attending: Edgar Kraus    Discharge Diagnosis:   Same, delivered  Procedures: spontaneous vaginal delivery  Discharge Attending: Dr Stevens     [facility] Course:   Ms. María Prasad is a 39 y.o. now  at 40wk0d. She presented to labor and delivery for elective induction of labor. SVE on admission was 4/70/-3. Pitocin was started for her induction. She received an epidural for pain control. An amniotomy was performed for clear fluid. She progressed to complete dilation and began pushing.      She delivered a viable female  on 2024 at 0534. Weight 7lbs 12.5oz via spontaneous vaginal delivery. Apgars were 8 (1 min) and 9 (5 min).  was transferred to  nursery. Patient tolerated the procedure well and was transferred to recovery in stable condition.      Her postpartum course was complicated by VWD managed with prophylactic TXA. Her postpartum pain was well controlled with oral analgesics.     On day of discharge, she was ambulating and able to reasonably perform all ADLs. She was voiding and had appropriate bowel function. Pain was well controlled. She was discharged home on post-partum day #2 without  complications. Patient was instructed to follow up with her OB as an outpatient and was given appropriate warnings to call provider if she develops signs of infection or uncontrolled pain.     Complications: none apparent     Condition at discharge: good      Discharge instructions/Information to patient and family:   - Do not place anything (no partner, tampons or douche) in your vagina for 6 weeks.  -You may walk for exercise for the first 6 weeks then gradually return to your usual activities.   -Please do not drive for 1 week if you have no stitches and for 2 weeks if you have stitches or underwent a  delivery.    -You may take baths or shower per your preference.   -Please look at your bust (breasts) in the mirror daily and call for redness or tenderness or increased warmth.   -Please call us for temperature > 100.4*F or 38* C, worsening pain or a foul discharge.      Discharge Medications:   Prenatal vitamin daily for 6 months or the duration of nursing whichever is longer.  Motrin 600 mg orally every 6 hours as needed for pain  Tylenol (over the counter) per bottle directions as needed for pain: do NOT use with percocet  Hydrocortisone cream 1% (over the counter) applied 1-2x daily to hemorrhoids as needed     Planned Readmission: No

## 2024-11-12 NOTE — DISCHARGE SUMMARY
Discharge Summary - OB/GYN   Name: María Prasad 39 y.o. female I MRN: [id number]  Unit/Bed#: -01 I Date of Admission: 10/31/2024      ADMISSION  Patient of: [facility]  Admission Date: 10/31/2024   Admitting Attending: Dr. Payan att. providers found  Admitting Diagnoses:   Patient Active Problem List   Diagnosis    Request for sterilization    Von Willebrand disease (HCC)    39 weeks gestation of pregnancy    Late prenatal care    Lack of immunity to hepatitis B virus demonstrated by serologic test    Suboxone maintenance treatment complicating pregnancy, antepartum (HCC)    Encounter for induction of labor     (spontaneous vaginal delivery)       DELIVERY  Delivery Method: Vaginal, Spontaneous   Delivery Date and Time: 2024 5:34 AM  Delivery Attending: Edgar Kraus    Discharge Diagnosis:   Same, delivered  Procedures: spontaneous vaginal delivery  Discharge Attending: Dr Stevens     [facility] Course:   Ms. María Prasad is a 39 y.o. now  at 40wk0d. She presented to labor and delivery for elective induction of labor. SVE on admission was 4/70/-3. Pitocin was started for her induction. She received an epidural for pain control. An amniotomy was performed for clear fluid. She progressed to complete dilation and began pushing.      She delivered a viable female  on 2024 at 0534. Weight 7lbs 12.5oz via spontaneous vaginal delivery. Apgars were 8 (1 min) and 9 (5 min).  was transferred to  nursery. Patient tolerated the procedure well and was transferred to recovery in stable condition.      Her postpartum course was complicated by VWD managed with prophylactic TXA. Her postpartum pain was well controlled with oral analgesics.     On day of discharge, she was ambulating and able to reasonably perform all ADLs. She was voiding and had appropriate bowel function. Pain was well controlled. She was discharged home on post-partum day #1 without  complications. Patient was instructed to follow up with her OB as an outpatient and was given appropriate warnings to call provider if she develops signs of infection or uncontrolled pain.     Complications: none apparent     Condition at discharge: good      Discharge instructions/Information to patient and family:   - Do not place anything (no partner, tampons or douche) in your vagina for 6 weeks.  -You may walk for exercise for the first 6 weeks then gradually return to your usual activities.   -Please do not drive for 1 week if you have no stitches and for 2 weeks if you have stitches or underwent a  delivery.    -You may take baths or shower per your preference.   -Please look at your bust (breasts) in the mirror daily and call for redness or tenderness or increased warmth.   -Please call us for temperature > 100.4*F or 38* C, worsening pain or a foul discharge.      Discharge Medications:   Prenatal vitamin daily for 6 months or the duration of nursing whichever is longer.  Motrin 600 mg orally every 6 hours as needed for pain  Tylenol (over the counter) per bottle directions as needed for pain: do NOT use with percocet  Hydrocortisone cream 1% (over the counter) applied 1-2x daily to hemorrhoids as needed     Planned Readmission: No

## 2024-11-25 ENCOUNTER — TELEPHONE (OUTPATIENT)
Dept: HEMATOLOGY ONCOLOGY | Facility: CLINIC | Age: 39
End: 2024-11-25

## 2024-11-25 ENCOUNTER — POSTPARTUM VISIT (OUTPATIENT)
Dept: OBGYN CLINIC | Facility: CLINIC | Age: 39
End: 2024-11-25

## 2024-11-25 VITALS
DIASTOLIC BLOOD PRESSURE: 71 MMHG | BODY MASS INDEX: 24.66 KG/M2 | SYSTOLIC BLOOD PRESSURE: 109 MMHG | WEIGHT: 148 LBS | RESPIRATION RATE: 18 BRPM | HEART RATE: 76 BPM | HEIGHT: 65 IN

## 2024-11-25 PROCEDURE — 99213 OFFICE O/P EST LOW 20 MIN: CPT | Performed by: OBSTETRICS & GYNECOLOGY

## 2024-11-25 RX ORDER — SWAB
1 SWAB, NON-MEDICATED MISCELLANEOUS DAILY
Qty: 90 TABLET | Refills: 3 | Status: SHIPPED | OUTPATIENT
Start: 2024-11-25

## 2024-11-25 NOTE — TELEPHONE ENCOUNTER
Left . Pt rs for 1/6 @ 140pm w/ Dr. Boateng at the [facility]. Left hope line [phone] for pt to confirm or rs appt.

## 2024-11-25 NOTE — PROGRESS NOTES
"POSTPARTUM VISIT    María Prasad presents today for postpartum visit.  She had a vaginal  delivery on 11/1/2024 .  Complications included VWD managed with prophylactic TXA .  She is bottlefeeding her infant and reports no issues with such.  She is on OCPs - norethindrone for contraception.  She was provided with [place] Depression Screening tool and her score was 0.    Review of Systems:   -Constitutional: denies issues, denies pain   -Breasts: denies tenderness   -Gynecologic: lochia   -Urinary: denies issues urinating   -GI: stools WNL, denies issues    Physical Exam:   -Vitals:   Vitals:    11/25/24 0851   BP: 109/71   BP Location: Left arm   Patient Position: Sitting   Cuff Size: Adult   Pulse: 76   Resp: 18   Weight: 67.1 kg (148 lb)   Height: 5' 5\" (1.651 m)      -General: A&Ox3, no acute distress noted   -Abdomen: soft, non-tender,  -Extremities: nontender, no edema noted        Assessment/Plan:  1. Normal postpartum exam.  2. Depression screening negative  3. Last pap smear was done in 2015 and result was normal.  Advise return for next annual GYN exam in 1 month - 12/23/2024  . Contraception: OCP       "

## 2024-12-23 PROBLEM — Z3A.39 39 WEEKS GESTATION OF PREGNANCY: Status: RESOLVED | Noted: 2024-08-28 | Resolved: 2024-12-23

## 2024-12-23 PROBLEM — Z34.90 ENCOUNTER FOR INDUCTION OF LABOR: Status: RESOLVED | Noted: 2024-10-31 | Resolved: 2024-12-23

## 2024-12-30 ENCOUNTER — TELEPHONE (OUTPATIENT)
Dept: HEMATOLOGY ONCOLOGY | Facility: CLINIC | Age: 39
End: 2024-12-30

## 2025-01-06 ENCOUNTER — TELEPHONE (OUTPATIENT)
Dept: HEMATOLOGY ONCOLOGY | Facility: CLINIC | Age: 40
End: 2025-01-06

## 2025-01-06 NOTE — TELEPHONE ENCOUNTER
Called patient in regards to missed appointment. Patients phone is not accepting calls at this time.    I did try to reach her mom Jael as she is on the Communication consent, but went to voicemail and unable to leave message as mailbox is full.      Thank you

## 2025-02-04 ENCOUNTER — DOCUMENTATION (OUTPATIENT)
Dept: OBGYN CLINIC | Facility: CLINIC | Age: 40
End: 2025-02-04

## 2025-02-04 NOTE — PROGRESS NOTES
L/M   for pt  to call back  she missed  her pre op  tubal   1/28     she needs to make appt ASAP  this week  or if she does  not want surgery   to let me know   did give her my teams  number   told her to call

## 2025-02-06 ENCOUNTER — DOCUMENTATION (OUTPATIENT)
Dept: GYNECOLOGY | Facility: CLINIC | Age: 40
End: 2025-02-06

## 2025-02-06 NOTE — PROGRESS NOTES
I was not available  to reach  pt by phone  yeasterday   so I did sent emaill need her confiem  she is going to having  tubal  2/10   I called  her today was able to leave  message   told  her to call me  to confirm she is having  procedure    done.   Gave her my numbers.

## 2025-02-07 ENCOUNTER — DOCUMENTATION (OUTPATIENT)
Dept: GYNECOLOGY | Facility: CLINIC | Age: 40
End: 2025-02-07

## 2025-02-07 NOTE — PROGRESS NOTES
Pt  has  cancelled her  tubal  for 2/10  in [place]  at this  time   I did notify  the OR  and sent several messages  to Dr tomlinson  to inform her as well.

## (undated) DEVICE — LIGHT HANDLE COVER SLEEVE DISP BLUE STELLAR

## (undated) DEVICE — STRL ALLENTOWN HYSTEROSCOPY PK: Brand: CARDINAL HEALTH

## (undated) DEVICE — GLOVE PI ULTRA TOUCH SZ.7.0

## (undated) DEVICE — PREMIUM DRY TRAY LF: Brand: MEDLINE INDUSTRIES, INC.

## (undated) DEVICE — CHLORHEXIDINE 4PCT 4 OZ

## (undated) DEVICE — SPONGE LAP 18 X 18 IN STRL RFD

## (undated) DEVICE — STERILE SURGICAL LUBRICANT,  TUBE: Brand: SURGILUBE

## (undated) DEVICE — PVC URETHRAL CATHETER: Brand: DOVER

## (undated) DEVICE — CURETTE VACURETTE CRVD 10MM

## (undated) DEVICE — GLOVE INDICATOR PI UNDERGLOVE SZ 7.5 BLUE